# Patient Record
Sex: MALE | Race: WHITE | NOT HISPANIC OR LATINO | Employment: FULL TIME | URBAN - METROPOLITAN AREA
[De-identification: names, ages, dates, MRNs, and addresses within clinical notes are randomized per-mention and may not be internally consistent; named-entity substitution may affect disease eponyms.]

---

## 2017-01-02 DIAGNOSIS — Z94.4 LIVER REPLACED BY TRANSPLANT (H): Primary | ICD-10-CM

## 2017-01-10 ENCOUNTER — TELEPHONE (OUTPATIENT)
Dept: PHARMACY | Facility: CLINIC | Age: 57
End: 2017-01-10

## 2017-01-10 LAB
FERRITIN SERPL-MCNC: 759 NG/ML
HCT VFR BLD AUTO: 31.8 %
HEMOGLOBIN: 10.9 G/DL (ref 13.3–17.7)
IRON BINDING CAP: 272
IRON SATURATION INDEX: 40 %
IRON: 108 UG/DL

## 2017-01-10 NOTE — TELEPHONE ENCOUNTER
Anemia Management Note  SUBJECTIVE/OBJECTIVE:  Referred by Dr. Hardeep Villa on 6/30/2016.  Primary Diagnosis: Anemia in Chronic Kidney Disease (N18.3, D63.1)      Secondary Diagnosis:  Chronic Kidney Disease, Stage 3 (N18.3)    Hgb goal range:  9-10  Epo/Darbo:   Aranesp 60 mcg/0.3ml every 28 days as needed for Hgb < 10.0 - At home   RX expires 10/27/17  Iron regimen:  Ferrous Sulfate QD - max tolerated dose  Lab exp:  7/7/17 in LETTERS     Anemia Latest Ref Rng 10/19/2016 10/27/2016 11/4/2016 11/15/2016 11/29/2016 12/13/2016 1/10/2017   MARCUS Dose - 60 mcg - - - - - -   Hemoglobin 13.3 - 17.7 g/dL - 10.6(L) - 10.2(A) 10.2(A) 10.4(A) 10.9(A)   IV Iron Dose - - - 750mg - - - -   TSAT - - 17 - 36 - 35 40   Ferritin - - 411(H) - 837 - 798 759     BP Readings from Last 3 Encounters:   10/27/16 121/82   10/14/16 139/92   10/03/16 169/97     Wt Readings from Last 2 Encounters:   10/27/16 217 lb 3.2 oz (98.521 kg)   10/14/16 217 lb 6.4 oz (98.612 kg)         ASSESSMENT:  Hgb: Above goal - recommend hold dose  TSat: at goal >30% Ferritin: At goal (>100ng/mL)    PLAN:  Hold Aranesp and RTC for hgb, ferritin and iron labs then aranesp if needed in 4 week(s)    Orders needed to be renewed (for next follow-up date) in LETTERS - for external labs: None    Iron labs due:  2/7/17    Plan discussed with:  MADELIN Serna  Plan provided by:  Hong    NEXT FOLLOW-UP DATE:  2/7/17    Anemia Management Service  Rosie Blankenship,LilliamD and Moira Bo CPhT  Phone: [phone]  Fax: [phone]

## 2017-01-13 ENCOUNTER — OFFICE VISIT (OUTPATIENT)
Dept: GASTROENTEROLOGY | Facility: CLINIC | Age: 57
End: 2017-01-13
Attending: INTERNAL MEDICINE
Payer: COMMERCIAL

## 2017-01-13 VITALS
SYSTOLIC BLOOD PRESSURE: 143 MMHG | BODY MASS INDEX: 36.43 KG/M2 | HEIGHT: 69 IN | OXYGEN SATURATION: 98 % | WEIGHT: 246 LBS | HEART RATE: 83 BPM | DIASTOLIC BLOOD PRESSURE: 87 MMHG | TEMPERATURE: 97.2 F

## 2017-01-13 DIAGNOSIS — Z94.4 LIVER TRANSPLANTED (H): Primary | ICD-10-CM

## 2017-01-13 PROCEDURE — 99212 OFFICE O/P EST SF 10 MIN: CPT | Mod: ZF

## 2017-01-13 RX ORDER — MAGNESIUM OXIDE 400 MG/1
TABLET ORAL
COMMUNITY
Start: 2016-12-15 | End: 2017-01-13

## 2017-01-13 RX ORDER — MYCOPHENOLIC ACID 360 MG/1
720 TABLET, DELAYED RELEASE ORAL 2 TIMES DAILY
Qty: 120 TABLET | Refills: 11 | Status: SHIPPED | OUTPATIENT
Start: 2017-01-13 | End: 2017-12-30

## 2017-01-13 RX ORDER — PREDNISONE 5 MG/1
5 TABLET ORAL DAILY
Qty: 30 TABLET | Refills: 11 | Status: SHIPPED | OUTPATIENT
Start: 2017-01-13 | End: 2017-12-30

## 2017-01-13 RX ORDER — MAGNESIUM OXIDE 400 MG/1
400 TABLET ORAL
COMMUNITY
End: 2017-01-13

## 2017-01-13 RX ORDER — ACETAMINOPHEN 325 MG/1
650 TABLET ORAL
COMMUNITY
Start: 2016-09-30

## 2017-01-13 ASSESSMENT — ENCOUNTER SYMPTOMS: NEW SYMPTOMS OF CORONARY ARTERY DISEASE: 0

## 2017-01-13 ASSESSMENT — PAIN SCALES - GENERAL: PAINLEVEL: NO PAIN (0)

## 2017-01-13 NOTE — NURSING NOTE
Here for post liver transplant follow-up.  Accompanied by wife, Daiana.  Feeling well.  Has gained a lot of weight, we cautioned him about this.  He is aware that he needs to eat less and exercise.  Creat from yesterday's labs is 2.0 and prograf level 4.6.  He is now 9 mos post transplant.  Dr. Rand in agreement that we should try to wean off CNI.  I instructed him to wean as follows:  Currently taking tacrolimus 4 bid and cellcept 500 mg po bid.  I asked him to start taking prednisone 5 mg po qd and myfortic 720 mg po bid.  He has a supply of MMF and will take 1000 mg of this until his supply runs out, then make the switch to Myfortic. Asked him to reduce prograf to 2 mg po bid for a week, then 1 mg po bid for a week, then 1 mg po qd for a week then stop.  I asked him to have labs checked every 2 weeks for 2 mos.  If all remains normal,then monthly x 2, then q 2 mos.  He will need a new lab order faxed to his lab in [place].   Reviewed recent labs and assisted with interpretation.  Is recording labs in post transplant lab book.  Reviewed need for annual follow-up here with hepatology and dermatology.    Current immunosuppression:    Begin prograf wean.  Start pred 5 and myfortic 720 bid    Complaints:  Weight gain.    Med changes: Weaning prograf as above.  Updated patient's med card.    Lab frequency:  See note.    Follow-up:  Hepatology, derm and PCP annually.

## 2017-01-13 NOTE — Clinical Note
"1/13/2017      RE: Yung Grande  [address] [facility] [facility] [address]       SUBJECTIVE:  Mr. Grande is a 56-year-old man status post liver transplant 04/05/2016 for alcoholic cirrhosis.        He has had problems with elevated creatinine, running around 1.7 to low 2s.  He is on low dose Prograf.  Was placed on everolimus by Dr. Armstrong.  His Prograf levels have been purposefully run on the low side because of the kidney function.  He has seen Dr. Villa for his elevated creatinine.  Last creat was 1.76 one month ago.      He had terrible mouth sores attributed to everolimus which we stopped.He is also on low dose MMF now.       He has gone back to work, working 5 days a week but not full days. Energy level is getting better.  He has gained a significant amount of weight and is getting a little bit worried about this. Currently at 246 and thinks he should weigh less than 200.         He has also had ongoing anemia and had been on iron for this.        PHYSICAL EXAMINATION:    VITAL SIGNS:  Vitals: /87 mmHg  Pulse 83  Temp(Src) 97.2  F (36.2  C) (Oral)  Ht 1.753 m (5' 9\")  Wt 111.585 kg (246 lb)  BMI 36.31 kg/m2  SpO2 98%BMI=   GENERAL:  Pleasant, well-appearing, in no acute distress.    HEENT:  No icterus.  He has 2 aphthous ulcers on the lower gum line.      LYMPH:  No supraclavicular or cervical lymphadenopathy.    CARDIOVASCULAR:  Regular rate and rhythm.    CHEST:  Lungs are clear.    ABDOMEN:  Bowel sounds are present.  Abdomen is soft, nontender, nondistended.  Scar is well healed.    EXTREMITIES:  No edema.    SKIN:  No rash.    NEUROLOGIC:  Speech is fluent and clear.  No asterixis or tremor.        LABORATORY DATA:  Labs from 12/13/2016 were reviewed.  His kidney function is about the same at 1.7.  Liver function is normal.  Hemoglobin is stable.        ASSESSMENT AND PLAN:  A 56-year-old man status post liver transplant 9 months ago for alcoholic cirrhosis.  He has had some problems with side " effects from immunosuppression.  I would like to switch him to MMF and prednisone.        I cautioned him about his metabolism and weight gain and risks associated with weight gain. Again encouraged him to include regular exercise and dietary modifications.    He should stop iron and iron levels should stop getting measured so often.       He is otherwise doing quite well.  I will see Kareem back in 3 months.  This was a 25 minute visit, over 50% counseling and coordination of care.       Stefany Rand MD

## 2017-01-13 NOTE — MR AVS SNAPSHOT
After Visit Summary   1/13/2017    Yung Grande    MRN: [id number]           Patient Information     Date Of Birth          1960        Visit Information        Provider Department      1/13/2017 10:10 AM Stefany Rand MD [facility] Hepatology         Follow-ups after your visit        Your next 10 appointments already scheduled     Mar 09, 2017  1:30 PM   (Arrive by 1:00 PM)   Return Visit with Hardeep Villa MD   [facility] Nephrology ([facility])    [address] [place] [id number] [phone]            Apr 14, 2017 10:50 AM   (Arrive by 10:35 AM)   Return Liver Transplant with Stefany Rand MD   [facility] Hepatology ([facility])    [address] [place] [id number] [phone]            Oct 02, 2017 10:10 AM   (Arrive by 9:55 AM)   Liver Return Post Op with Yazan Khanna MD   [facility] Solid Organ Transplant ([facility])    [address] [place] [id number] [phone]              Who to contact     If you have questions or need follow up information about today's clinic visit or your schedule please contact [facility] HEPATOLOGY directly at [phone].  Normal or non-critical lab and imaging results will be communicated to you by MyChart, letter or phone within 4 business days after the clinic has received the results. If you do not hear from us within 7 days, please contact the clinic through MyChart or phone. If you have a critical or abnormal lab result, we will notify you by phone as soon as possible.  Submit refill requests through [employer] or call your pharmacy and they will forward the refill request to us. Please allow 3 business days for your refill to be completed.          Additional Information About Your Visit        [employer] Information     [employer] gives you secure access to your  "electronic health record. If you see a primary care provider, you can also send messages to your care team and make appointments. If you have questions, please call your primary care clinic.  If you do not have a primary care provider, please call [phone] and they will assist you.        Care EveryWhere ID     This is your Care EveryWhere ID. This could be used by other organizations to access your [place] medical records  [id number]        Your Vitals Were     Pulse Temperature Height BMI (Body Mass Index) Pulse Oximetry       83 97.2  F (36.2  C) (Oral) 1.753 m (5' 9\") 36.31 kg/m2 98%        Blood Pressure from Last 3 Encounters:   01/13/17 143/87   10/27/16 121/82   10/14/16 139/92    Weight from Last 3 Encounters:   01/13/17 111.585 kg (246 lb)   10/27/16 98.521 kg (217 lb 3.2 oz)   10/14/16 98.612 kg (217 lb 6.4 oz)              Today, you had the following     No orders found for display         Today's Medication Changes          These changes are accurate as of: 1/13/17 10:42 AM.  If you have any questions, ask your nurse or doctor.               These medicines have changed or have updated prescriptions.        Dose/Directions    tacrolimus 1 MG capsule   Commonly known as:  PROGRAF - GENERIC EQUIVALENT   Indication:  liver   This may have changed:    - how much to take  - how to take this  - when to take this  - additional instructions   Used for:  Liver replaced by transplant (H)        Take 4 mg every AM, and 4 mg every PM, then when everolimus arrives decrease tacrolimus dose to 3 mg every 12 hours.   Quantity:  180 capsule   Refills:  11                Primary Care Provider Office Phone # Fax #    Anthony OLIVERA Weinstein [phone]       [facility] [address]  [facility] [address]        Thank you!     Thank you for choosing [facility] HEPATOLOGY  for your care. Our goal is always to provide you with excellent care. Hearing back from our patients is one way we " can continue to improve our services. Please take a few minutes to complete the written survey that you may receive in the mail after your visit with us. Thank you!             Your Updated Medication List - Protect others around you: Learn how to safely use, store and throw away your medicines at www.disposemymeds.org.          This list is accurate as of: 1/13/17 10:42 AM.  Always use your most recent med list.                   Brand Name Dispense Instructions for use    Aluminum & Magnesium Hydroxide 200-200 MG/5ML Susp      5 mLs       CENTRUM SILVER per tablet      Take 1 tablet by mouth daily       darbepoetin dianelys 60 MCG/0.3ML injection    ARANESP (ALBUMIN FREE)    0.3 mL    Inject 0.3 mLs (60 mcg) Subcutaneous every 28 days As needed for hgb <10g/dL       ferrous sulfate 325 (65 FE) MG tablet    IRON     Take 325 mg by mouth daily (with breakfast)       MAPAP 325 MG tablet   Generic drug:  acetaminophen      Take 650 mg by mouth       mycophenolate 500 MG tablet    CELLCEPT - GENERIC EQUIVALENT    60 tablet    Take 1 tablet (500 mg) by mouth every 12 hours       tacrolimus 1 MG capsule    PROGRAF - GENERIC EQUIVALENT    180 capsule    Take 4 mg every AM, and 4 mg every PM, then when everolimus arrives decrease tacrolimus dose to 3 mg every 12 hours.

## 2017-01-13 NOTE — Clinical Note
"1/13/2017       RE: Yung Grande  [address] [facility] [address] [facility] [place] [address]     Dear Colleague,    Thank you for referring your patient, Yung Grande, to the [facility] HEPATOLOGY at [facility]. Please see a copy of my visit note below.    SUBJECTIVE:  Mr. Grande is a 56-year-old man status post liver transplant 04/05/2016 for alcoholic cirrhosis.        He has had problems with elevated creatinine, running around 1.7 to low 2s.  He is on low dose Prograf.  Was placed on everolimus by Dr. Armstrong.  His Prograf levels have been purposefully run on the low side because of the kidney function.  He has seen Dr. Villa for his elevated creatinine.  Last creat was 1.76 one month ago.      He had terrible mouth sores attributed to everolimus which we stopped.He is also on low dose MMF now.       He has gone back to work, working 5 days a week but not full days. Energy level is getting better.  He has gained a significant amount of weight and is getting a little bit worried about this. Currently at 246 and thinks he should weigh less than 200.         He has also had ongoing anemia and had been on iron for this.        PHYSICAL EXAMINATION:    VITAL SIGNS:  Vitals: /87 mmHg  Pulse 83  Temp(Src) 97.2  F (36.2  C) (Oral)  Ht 1.753 m (5' 9\")  Wt 111.585 kg (246 lb)  BMI 36.31 kg/m2  SpO2 98%BMI=   GENERAL:  Pleasant, well-appearing, in no acute distress.    HEENT:  No icterus.  He has 2 aphthous ulcers on the lower gum line.      LYMPH:  No supraclavicular or cervical lymphadenopathy.    CARDIOVASCULAR:  Regular rate and rhythm.    CHEST:  Lungs are clear.    ABDOMEN:  Bowel sounds are present.  Abdomen is soft, nontender, nondistended.  Scar is well healed.    EXTREMITIES:  No edema.    SKIN:  No rash.    NEUROLOGIC:  Speech is fluent and clear.  No asterixis or tremor.        LABORATORY DATA:  Labs from 12/13/2016 were reviewed.  His kidney function is about the same at 1.7.  " Liver function is normal.  Hemoglobin is stable.        ASSESSMENT AND PLAN:  A 56-year-old man status post liver transplant 9 months ago for alcoholic cirrhosis.  He has had some problems with side effects from immunosuppression.  I would like to switch him to MMF and prednisone.        I cautioned him about his metabolism and weight gain and risks associated with weight gain. Again encouraged him to include regular exercise and dietary modifications.    He should stop iron and iron levels should stop getting measured so often.       He is otherwise doing quite well.  I will see Kareem back in 3 months.  This was a 25 minute visit, over 50% counseling and coordination of care.     Again, thank you for allowing me to participate in the care of your patient.      Sincerely,    Stefany Rand MD

## 2017-01-13 NOTE — NURSING NOTE
Chief Complaint   Patient presents with     RECHECK     Post Liver TXP   Pt roomed, vitals, meds, and allergies reviewed with pt. Pt ready for provider.  Deonte Tate, CMA

## 2017-01-13 NOTE — PROGRESS NOTES
"SUBJECTIVE:  Mr. Grande is a 56-year-old man status post liver transplant 04/05/2016 for alcoholic cirrhosis.        He has had problems with elevated creatinine, running around 1.7 to low 2s.  He is on low dose Prograf.  Was placed on everolimus by Dr. Armstrong.  His Prograf levels have been purposefully run on the low side because of the kidney function.  He has seen Dr. Villa for his elevated creatinine.  Last creat was 1.76 one month ago.      He had terrible mouth sores attributed to everolimus which we stopped.He is also on low dose MMF now.       He has gone back to work, working 5 days a week but not full days. Energy level is getting better.  He has gained a significant amount of weight and is getting a little bit worried about this. Currently at 246 and thinks he should weigh less than 200.         He has also had ongoing anemia and had been on iron for this.        PHYSICAL EXAMINATION:    VITAL SIGNS:  Vitals: /87 mmHg  Pulse 83  Temp(Src) 97.2  F (36.2  C) (Oral)  Ht 1.753 m (5' 9\")  Wt 111.585 kg (246 lb)  BMI 36.31 kg/m2  SpO2 98%BMI=   GENERAL:  Pleasant, well-appearing, in no acute distress.    HEENT:  No icterus.  He has 2 aphthous ulcers on the lower gum line.      LYMPH:  No supraclavicular or cervical lymphadenopathy.    CARDIOVASCULAR:  Regular rate and rhythm.    CHEST:  Lungs are clear.    ABDOMEN:  Bowel sounds are present.  Abdomen is soft, nontender, nondistended.  Scar is well healed.    EXTREMITIES:  No edema.    SKIN:  No rash.    NEUROLOGIC:  Speech is fluent and clear.  No asterixis or tremor.        LABORATORY DATA:  Labs from 12/13/2016 were reviewed.  His kidney function is about the same at 1.7.  Liver function is normal.  Hemoglobin is stable.        ASSESSMENT AND PLAN:  A 56-year-old man status post liver transplant 9 months ago for alcoholic cirrhosis.  He has had some problems with side effects from immunosuppression.  I would like to switch him to MMF and " prednisone.        I cautioned him about his metabolism and weight gain and risks associated with weight gain. Again encouraged him to include regular exercise and dietary modifications.    He should stop iron and iron levels should stop getting measured so often.       He is otherwise doing quite well.  I will see Kareem back in 3 months.  This was a 25 minute visit, over 50% counseling and coordination of care.

## 2017-01-19 ENCOUNTER — DOCUMENTATION ONLY (OUTPATIENT)
Dept: TRANSPLANT | Facility: CLINIC | Age: 57
End: 2017-01-19

## 2017-01-19 NOTE — PROGRESS NOTES
Per protocol, this patient should be having ETG q 3 mos.  Hasn't had one since 2/2016.  Please fax an order to his clinic.

## 2017-01-27 LAB — HEMOGLOBIN: 11.4 G/DL (ref 13.3–17.7)

## 2017-02-03 ENCOUNTER — TELEPHONE (OUTPATIENT)
Dept: PHARMACY | Facility: CLINIC | Age: 57
End: 2017-02-03

## 2017-02-03 DIAGNOSIS — N18.9 ANEMIA DUE TO CHRONIC KIDNEY DISEASE: Primary | ICD-10-CM

## 2017-02-03 DIAGNOSIS — D63.1 ANEMIA DUE TO CHRONIC KIDNEY DISEASE: Primary | ICD-10-CM

## 2017-02-03 RX ORDER — FERROUS SULFATE 325(65) MG
325 TABLET ORAL
Qty: 100 TABLET | COMMUNITY
Start: 2017-02-03 | End: 2017-04-18

## 2017-02-03 NOTE — TELEPHONE ENCOUNTER
Anemia Management Note  SUBJECTIVE/OBJECTIVE:  Referred by Dr. Hardeep Villa on 6/30/2016.  Primary Diagnosis: Anemia in Chronic Kidney Disease (N18.3, D63.1)      Secondary Diagnosis:  Chronic Kidney Disease, Stage 3 (N18.3)    Hgb goal range:  9-10  Epo/Darbo:   Aranesp 60 mcg/0.3ml every 28 days as needed for Hgb < 10.0 - At home   RX expires 10/27/17  Iron regimen:  Ferrous Sulfate QOD - max tolerated dose  Lab exp:  7/7/17 in LETTERS     Anemia Latest Ref Rng 10/27/2016 11/4/2016 11/15/2016 11/29/2016 12/13/2016 1/10/2017 1/27/2017   MARCUS Dose - - - - - - - -   Hemoglobin 13.3 - 17.7 g/dL 10.6(L) - 10.2(A) 10.2(A) 10.4(A) 10.9(A) 11.4(A)   IV Iron Dose - - 750mg - - - - -   TSAT - 17 - 36 - 35 40 -   Ferritin - 411(H) - 247 - 798 759 -     BP Readings from Last 3 Encounters:   01/13/17 143/87   10/27/16 121/82   10/14/16 139/92     Wt Readings from Last 2 Encounters:   01/13/17 246 lb (111.585 kg)   10/27/16 217 lb 3.2 oz (98.521 kg)     ASSESSMENT:  Hgb:at goal - continue to monitor - DC aranesp as he has not needed in 3 months  TSat: at goal >30% Ferritin: At goal (>100ng/mL). Continue ferrous sulfate 325mg every other day    PLAN:  DC'd aranesp  Check hgb in one month  Check iron studies and hgb in 3 month(s)  DC anemia service if hgb stable    Orders needed to be renewed (for next follow-up date) in [facility]: None    Iron labs due:  4/10    Plan discussed with:  MADELIN Serna  Plan provided by:  Rosie    NEXT FOLLOW-UP DATE:  3/10    Anemia Management Service  Rosie Blankenship,LilliamD and Moira Bo CPhT  Phone: [phone]  Fax: [phone]

## 2017-02-20 DIAGNOSIS — R79.89 ELEVATED SERUM CREATININE: Primary | ICD-10-CM

## 2017-02-20 NOTE — NURSING NOTE
Labs per clinic 2A protocol.  Follow up/eleavted cr  Last OV: 10/27/16  Luz Elena Porras LPN  Nephrology  [facility]  [phone]

## 2017-03-07 LAB — HEMOGLOBIN: 10.8 G/DL (ref 13.3–17.7)

## 2017-03-08 ASSESSMENT — ENCOUNTER SYMPTOMS
WEIGHT GAIN: 1
NAIL CHANGES: 0
WEIGHT LOSS: 0
POOR WOUND HEALING: 0
EYE PAIN: 0
POLYPHAGIA: 1
EYE WATERING: 0
EYE REDNESS: 0
FEVER: 0
DECREASED APPETITE: 0
POLYDIPSIA: 0
ALTERED TEMPERATURE REGULATION: 0
FATIGUE: 0
CHILLS: 0
NIGHT SWEATS: 0
HALLUCINATIONS: 0
DOUBLE VISION: 0
SKIN CHANGES: 0
EYE IRRITATION: 0
INCREASED ENERGY: 0

## 2017-03-09 ENCOUNTER — OFFICE VISIT (OUTPATIENT)
Dept: NEPHROLOGY | Facility: CLINIC | Age: 57
End: 2017-03-09
Attending: INTERNAL MEDICINE
Payer: COMMERCIAL

## 2017-03-09 VITALS
TEMPERATURE: 98.3 F | SYSTOLIC BLOOD PRESSURE: 117 MMHG | HEIGHT: 69 IN | OXYGEN SATURATION: 98 % | HEART RATE: 89 BPM | BODY MASS INDEX: 38.36 KG/M2 | WEIGHT: 259 LBS | DIASTOLIC BLOOD PRESSURE: 81 MMHG

## 2017-03-09 DIAGNOSIS — N18.30 CKD (CHRONIC KIDNEY DISEASE) STAGE 3, GFR 30-59 ML/MIN (H): Primary | ICD-10-CM

## 2017-03-09 DIAGNOSIS — R79.89 ELEVATED SERUM CREATININE: ICD-10-CM

## 2017-03-09 LAB
CREAT UR-MCNC: 12 MG/DL
PROT UR-MCNC: 0.08 G/L
PROT/CREAT 24H UR: 0.68 G/G CR (ref 0–0.2)

## 2017-03-09 PROCEDURE — 99212 OFFICE O/P EST SF 10 MIN: CPT | Mod: ZF

## 2017-03-09 PROCEDURE — 84156 ASSAY OF PROTEIN URINE: CPT | Performed by: INTERNAL MEDICINE

## 2017-03-09 ASSESSMENT — PAIN SCALES - GENERAL: PAINLEVEL: NO PAIN (0)

## 2017-03-09 NOTE — NURSING NOTE
"Chief Complaint   Patient presents with     RECHECK     Follow up ANA       Initial /81  Pulse 89  Temp 98.3  F (36.8  C) (Oral)  Ht 1.753 m (5' 9\")  Wt 117.5 kg (259 lb)  SpO2 98%  BMI 38.25 kg/m2 Estimated body mass index is 38.25 kg/(m^2) as calculated from the following:    Height as of this encounter: 1.753 m (5' 9\").    Weight as of this encounter: 117.5 kg (259 lb).  Medication Reconciliation: complete   Evangelina Hardy. CMA    "

## 2017-03-09 NOTE — MR AVS SNAPSHOT
After Visit Summary   3/9/2017    Yung Grande    MRN: [id number]           Patient Information     Date Of Birth          1960        Visit Information        Provider Department      3/9/2017 1:30 PM Hardeep Villa MD [facility] Nephrology         Follow-ups after your visit        Follow-up notes from your care team     Return in about 6 months (around 9/9/2017).      Your next 10 appointments already scheduled     Apr 18, 2017 11:50 AM CDT   (Arrive by 11:35 AM)   Return Liver Transplant with Stefany Rand MD   [facility] Hepatology ([facility])    [address] [place] [id number]   [phone]            Oct 02, 2017 10:10 AM CDT   (Arrive by 9:55 AM)   Liver Return Post Op with Yazan Khanna MD   [facility] Solid Organ Transplant ([facility])    [address] [place] [id number] [phone]              Future tests that were ordered for you today     Open Future Orders        Priority Expected Expires Ordered    Renal panel Routine  3/9/2018 3/9/2017    CBC with platelets Routine  3/9/2018 3/9/2017            Who to contact     If you have questions or need follow up information about today's clinic visit or your schedule please contact [facility] NEPHROLOGY directly at [phone].  Normal or non-critical lab and imaging results will be communicated to you by MyChart, letter or phone within 4 business days after the clinic has received the results. If you do not hear from us within 7 days, please contact the clinic through MyChart or phone. If you have a critical or abnormal lab result, we will notify you by phone as soon as possible.  Submit refill requests through [employer] or call your pharmacy and they will forward the refill request to us. Please allow 3 business days for your refill to be completed.          Additional Information About Your Visit       "  [employer] Information     [employer] gives you secure access to your electronic health record. If you see a primary care provider, you can also send messages to your care team and make appointments. If you have questions, please call your primary care clinic.  If you do not have a primary care provider, please call [phone] and they will assist you.        Care EveryWhere ID     This is your Care EveryWhere ID. This could be used by other organizations to access your [place] medical records  [id number]        Your Vitals Were     Pulse Temperature Height Pulse Oximetry BMI (Body Mass Index)       89 98.3  F (36.8  C) (Oral) 1.753 m (5' 9\") 98% 38.25 kg/m2        Blood Pressure from Last 3 Encounters:   03/09/17 117/81   01/13/17 143/87   10/27/16 121/82    Weight from Last 3 Encounters:   03/09/17 117.5 kg (259 lb)   01/13/17 111.6 kg (246 lb)   10/27/16 98.5 kg (217 lb 3.2 oz)              Today, you had the following     No orders found for display       Primary Care Provider Office Phone # Fax #    Anthony Weinstein [phone]       [facility] [address]  [facility] [address]        Thank you!     Thank you for choosing [facility] NEPHROLOGY  for your care. Our goal is always to provide you with excellent care. Hearing back from our patients is one way we can continue to improve our services. Please take a few minutes to complete the written survey that you may receive in the mail after your visit with us. Thank you!             Your Updated Medication List - Protect others around you: Learn how to safely use, store and throw away your medicines at www.disposemymeds.org.          This list is accurate as of: 3/9/17  2:24 PM.  Always use your most recent med list.                   Brand Name Dispense Instructions for use    Aluminum & Magnesium Hydroxide 200-200 MG/5ML Susp      5 mLs Reported on 3/9/2017       CENTRUM SILVER per tablet      Take 1 tablet by mouth daily "       ferrous sulfate 325 (65 FE) MG tablet    IRON    100 tablet    Take 1 tablet (325 mg) by mouth every 48 hours       MAPAP 325 MG tablet   Generic drug:  acetaminophen      Take 650 mg by mouth       mycophenolic acid EC tablet     120 tablet    Take 2 tablets (720 mg) by mouth 2 times daily       predniSONE 5 MG tablet    DELTASONE    30 tablet    Take 1 tablet (5 mg) by mouth daily       tacrolimus 1 MG capsule    PROGRAF - GENERIC EQUIVALENT    180 capsule    Take 4 mg every AM, and 4 mg every PM, then when everolimus arrives decrease tacrolimus dose to 3 mg every 12 hours.       VITAMIN C PO      Take 500 mg by mouth daily

## 2017-03-09 NOTE — LETTER
3/9/2017      RE: Yung Grande  [address] [facility] [place] [address]       Nephrology Clinic Progress Note  Assessment & Recommendations:   56 year old male status post liver transplant on 4/5/16 with ANA requiring dialysis, now off dialysis with initial recovery. Etiology of injury is felt ot be related to HRS. Has ongoing issues with urinary retention and hyperkalemia (6.3) in the setting of high level of CNI (17.1) - now resolved. He has also had some problems with urinary retention requiring placement of a Mckinnon catheter and was on tamsulosin however now significantly improved. Doing well overall.     1. CKD - Patient had underlying dialysis-requiring ANA after liver Tx with recovery. Cr of 1.5 and improved. In total, likely hemodynamic in etiology in the setting of liver transplant with partial recovery; etiology felt to be related to HRS with CNI augmentation as well. UOP good. Electrolytes with no concerning values. No dizziness. No proteinuria.    -No need for RRT; will defer to Hepatology regarding target dose of CNI and ongoing use of mTor vs MMF   - Electrolytes: No issues.   2. Anemia - Hemoglobin of 10.8; iron studies returned with iron sat of 69% (7/21/2016). Iron replete   - No need for MARCUS at this time.   - Continue iron therapy  3. Bone and Mineral Disorder - Phos returned at 3.5 and acceptable.  4. OTLtx - 4/5; per Hepatology. Goal CNI dosing level of ~8.   - Prophylaxis - off TMP/SMX - cytopenias - defer to hepatology   - Would defer to transplant Hepatology regarding immunosuppression    Recommendations:  1) No medication changes - continue lasix  2) Will need to monitor prot/Cr with serial labs prior to changing to alternative agent (mtor, etc).   3) RTC in 6mo.    Hardeep Vilal MD     Interval History :   Doing well today with no complaints toady. He has no decreased energy and activity is increased. No CP or SOB. No AP. No problems with urination. Appetite is great and weight is  "increased as well.     Review of Systems:   A 4 point review of systems was negative except as noted above.    Physical Exam:   /81  Pulse 89  Temp 98.3  F (36.8  C) (Oral)  Ht 1.753 m (5' 9\")  Wt 117.5 kg (259 lb)  SpO2 98%  BMI 38.25 kg/m2  GENERAL APPEARANCE: alert and no distress  PULM: lungs clear to auscultation, equal air movement, no cyanosis or clubbing  CV: regular rhythm, normal rate, no rub        -edema trace.   GI: soft, distnded  : Mckinnon catheter in place - no urine in bag.   MS: trace edema.   NEURO: mentation intact and speech normal    Labs:   All labs reviewed by me  Cr of 1.52, K and HCO3 stable.   Albumin 3.4.  Prot/Cr of 0.68 (slightly increased).  Hgb of 10.8 and platelet of 116    Current Medications:  Current Outpatient Prescriptions   Medication     Ascorbic Acid (VITAMIN C PO)     ferrous sulfate (IRON) 325 (65 FE) MG tablet     acetaminophen (MAPAP) 325 MG tablet     MYFORTIC 360 MG PO EC TABLET     predniSONE (DELTASONE) 5 MG tablet     Multiple Vitamins-Minerals (CENTRUM SILVER) per tablet     Aluminum & Magnesium Hydroxide 200-200 MG/5ML SUSP     tacrolimus (PROGRAF - GENERIC EQUIVALENT) 1 MG capsule     No current facility-administered medications for this visit.         Hardeep Villa MD       "

## 2017-03-10 ENCOUNTER — TELEPHONE (OUTPATIENT)
Dept: PHARMACY | Facility: CLINIC | Age: 57
End: 2017-03-10

## 2017-03-15 NOTE — TELEPHONE ENCOUNTER
Anemia Management Note  SUBJECTIVE/OBJECTIVE:  Referred by Dr. Hardeep Villa on 6/30/2016.  Primary Diagnosis: Anemia in Chronic Kidney Disease (N18.3, D63.1)      Secondary Diagnosis:  Chronic Kidney Disease, Stage 3 (N18.3)    Hgb goal range:  9-10  Epo/Darbo:   Aranesp 60 mcg/0.3ml every 28 days as needed for Hgb < 10.0 - At home   RX expires 10/27/17  Iron regimen:  Ferrous Sulfate QOD - max tolerated dose  Lab exp:  7/7/17 in LETTERS     Anemia Latest Ref Rng & Units 11/4/2016 11/15/2016 11/29/2016 12/13/2016 1/10/2017 1/27/2017 3/7/2017   MARCUS Dose - - - - - - - -   Hemoglobin 13.3 - 17.7 g/dL - 10.2(A) 10.2(A) 10.4(A) 10.9(A) 11.4(A) 10.8(A)   IV Iron Dose - 750mg - - - - - -   TSAT % - 36 - 35 40 - -   Ferritin ng/mL - 837 - 798 759 - -      BP Readings from Last 3 Encounters:   03/09/17 117/81   01/13/17 143/87   10/27/16 121/82     Wt Readings from Last 2 Encounters:   03/09/17 259 lb (117.5 kg)   01/13/17 246 lb (111.6 kg)     ASSESSMENT:  Hgb:at goal and stable with no MARCUS.  DC anemia service  TSat: at goal >30% Ferritin: At goal (>100ng/mL)    PLAN:  Hgb stable with no MARCUS.  DC anemia service.    Plan discussed with:  MADELIN Serna  Plan provided by:  Rosie    Anemia Management Service  Rosie Blankenship,PharmD and Moira Bo CPhT  Phone: [phone]  Fax: [phone]

## 2017-03-15 NOTE — PROGRESS NOTES
Nephrology Clinic Progress Note  Assessment & Recommendations:   56 year old male status post liver transplant on 4/5/16 with ANA requiring dialysis, now off dialysis with initial recovery. Etiology of injury is felt ot be related to HRS. Has ongoing issues with urinary retention and hyperkalemia (6.3) in the setting of high level of CNI (17.1) - now resolved. He has also had some problems with urinary retention requiring placement of a Mckinnon catheter and was on tamsulosin however now significantly improved. Doing well overall.     1. CKD - Patient had underlying dialysis-requiring ANA after liver Tx with recovery. Cr of 1.5 and improved. In total, likely hemodynamic in etiology in the setting of liver transplant with partial recovery; etiology felt to be related to HRS with CNI augmentation as well. UOP good. Electrolytes with no concerning values. No dizziness. No proteinuria.    -No need for RRT; will defer to Hepatology regarding target dose of CNI and ongoing use of mTor vs MMF   - Electrolytes: No issues.   2. Anemia - Hemoglobin of 10.8; iron studies returned with iron sat of 69% (7/21/2016). Iron replete   - No need for MARCUS at this time.   - Continue iron therapy  3. Bone and Mineral Disorder - Phos returned at 3.5 and acceptable.  4. OTLtx - 4/5; per Hepatology. Goal CNI dosing level of ~8.   - Prophylaxis - off TMP/SMX - cytopenias - defer to hepatology   - Would defer to transplant Hepatology regarding immunosuppression    Recommendations:  1) No medication changes - continue lasix  2) Will need to monitor prot/Cr with serial labs prior to changing to alternative agent (mtor, etc).   3) RTC in 6mo.    Hardeep Villa MD     Interval History :   Doing well today with no complaints toady. He has no decreased energy and activity is increased. No CP or SOB. No AP. No problems with urination. Appetite is great and weight is increased as well.     Review of Systems:   A 4 point review of systems was  "negative except as noted above.    Physical Exam:   /81  Pulse 89  Temp 98.3  F (36.8  C) (Oral)  Ht 1.753 m (5' 9\")  Wt 117.5 kg (259 lb)  SpO2 98%  BMI 38.25 kg/m2  GENERAL APPEARANCE: alert and no distress  PULM: lungs clear to auscultation, equal air movement, no cyanosis or clubbing  CV: regular rhythm, normal rate, no rub        -edema trace.   GI: soft, distnded  : Mckinnon catheter in place - no urine in bag.   MS: trace edema.   NEURO: mentation intact and speech normal    Labs:   All labs reviewed by me  Cr of 1.52, K and HCO3 stable.   Albumin 3.4.  Prot/Cr of 0.68 (slightly increased).  Hgb of 10.8 and platelet of 116    Current Medications:  Current Outpatient Prescriptions   Medication     Ascorbic Acid (VITAMIN C PO)     ferrous sulfate (IRON) 325 (65 FE) MG tablet     acetaminophen (MAPAP) 325 MG tablet     MYFORTIC 360 MG PO EC TABLET     predniSONE (DELTASONE) 5 MG tablet     Multiple Vitamins-Minerals (CENTRUM SILVER) per tablet     Aluminum & Magnesium Hydroxide 200-200 MG/5ML SUSP     tacrolimus (PROGRAF - GENERIC EQUIVALENT) 1 MG capsule     No current facility-administered medications for this visit.         Hardeep Villa MD     "

## 2017-04-06 ENCOUNTER — TELEPHONE (OUTPATIENT)
Dept: PHARMACY | Facility: CLINIC | Age: 57
End: 2017-04-06

## 2017-04-18 ENCOUNTER — OFFICE VISIT (OUTPATIENT)
Dept: GASTROENTEROLOGY | Facility: CLINIC | Age: 57
End: 2017-04-18
Attending: INTERNAL MEDICINE
Payer: COMMERCIAL

## 2017-04-18 VITALS
OXYGEN SATURATION: 98 % | WEIGHT: 267.8 LBS | RESPIRATION RATE: 18 BRPM | TEMPERATURE: 98.3 F | BODY MASS INDEX: 39.66 KG/M2 | DIASTOLIC BLOOD PRESSURE: 93 MMHG | HEIGHT: 69 IN | SYSTOLIC BLOOD PRESSURE: 137 MMHG | HEART RATE: 74 BPM

## 2017-04-18 DIAGNOSIS — Z94.4 LIVER TRANSPLANTED (H): Primary | ICD-10-CM

## 2017-04-18 DIAGNOSIS — M54.40 LOW BACK PAIN WITH SCIATICA, SCIATICA LATERALITY UNSPECIFIED, UNSPECIFIED BACK PAIN LATERALITY, UNSPECIFIED CHRONICITY: ICD-10-CM

## 2017-04-18 PROCEDURE — 99213 OFFICE O/P EST LOW 20 MIN: CPT | Mod: ZF

## 2017-04-18 RX ORDER — SILDENAFIL CITRATE 20 MG/1
20 TABLET ORAL PRN
COMMUNITY
End: 2022-07-06

## 2017-04-18 ASSESSMENT — PAIN SCALES - GENERAL: PAINLEVEL: EXTREME PAIN (9)

## 2017-04-18 NOTE — MR AVS SNAPSHOT
After Visit Summary   4/18/2017    Yung Grande    MRN: [id number]           Patient Information     Date Of Birth          1960        Visit Information        Provider Department      4/18/2017 11:50 AM Stefany Rand MD [facility] Hepatology         Follow-ups after your visit        Your next 10 appointments already scheduled     Jul 18, 2017 10:10 AM CDT   (Arrive by 9:55 AM)   Return Liver Transplant with Stefany Rand MD   [facility] Hepatology ([facility])    [address] [place] [id number] [phone]            Sep 13, 2017 11:30 AM CDT   Lab with  LAB   [facility] Lab ([facility])    [address] [place] [id number] [phone]            Sep 13, 2017 12:30 PM CDT   (Arrive by 12:00 PM)   Return Visit with Gena Michel MD   [facility] Nephrology ([facility])    [address] [place] [id number] [phone]            Oct 02, 2017 10:10 AM CDT   (Arrive by 9:55 AM)   Liver Return Post Op with Yazan Khanna MD   [facility] Solid Organ Transplant ([facility])    [address] [place] [id number] [phone]              Who to contact     If you have questions or need follow up information about today's clinic visit or your schedule please contact [facility] HEPATOLOG[facility] directly at [phone].  Normal or non-critical lab and imaging results will be communicated to you by MyChart, letter or phone within 4 business days after the clinic has received the results. If you do not hear from us within 7 days, please contact the clinic through MyChart or phone. If you have a critical or abnormal lab result, we will notify you by phone as soon as possible.  Submit refill requests through [employer] or call your pharmacy and they will forward the refill  "request to us. Please allow 3 business days for your refill to be completed.          Additional Information About Your Visit        [employer] Information     [employer] gives you secure access to your electronic health record. If you see a primary care provider, you can also send messages to your care team and make appointments. If you have questions, please call your primary care clinic.  If you do not have a primary care provider, please call [phone] and they will assist you.        Care EveryWhere ID     This is your Care EveryWhere ID. This could be used by other organizations to access your [place] medical records  [id number]        Your Vitals Were     Pulse Temperature Respirations Height Pulse Oximetry BMI (Body Mass Index)    74 98.3  F (36.8  C) (Oral) 18 1.753 m (5' 9.02\") 98% 39.53 kg/m2       Blood Pressure from Last 3 Encounters:   04/18/17 (!) 137/93   03/09/17 117/81   01/13/17 143/87    Weight from Last 3 Encounters:   04/18/17 121.5 kg (267 lb 12.8 oz)   03/09/17 117.5 kg (259 lb)   01/13/17 111.6 kg (246 lb)              Today, you had the following     No orders found for display         Today's Medication Changes          These changes are accurate as of: 4/18/17 12:34 PM.  If you have any questions, ask your nurse or doctor.               Stop taking these medicines if you haven't already. Please contact your care team if you have questions.     tacrolimus 1 MG capsule   Commonly known as:  PROGRAF - GENERIC EQUIVALENT   Stopped by:  Stefany Rand MD                    Primary Care Provider Office Phone # Fax #    Anthony Weinstein [phone]       [facility] [address]  [facility] [address]        Thank you!     Thank you for choosing [facility] HEPATOLOG[facility]  for your care. Our goal is always to provide you with excellent care. Hearing back from our patients is one way we can continue to improve our services. Please take a few minutes to " complete the written survey that you may receive in the mail after your visit with us. Thank you!             Your Updated Medication List - Protect others around you: Learn how to safely use, store and throw away your medicines at www.disposemymeds.org.          This list is accurate as of: 4/18/17 12:34 PM.  Always use your most recent med list.                   Brand Name Dispense Instructions for use    CENTRUM SILVER per tablet      Take 1 tablet by mouth daily       MAPAP 325 MG tablet   Generic drug:  acetaminophen      Take 650 mg by mouth       mycophenolic acid EC tablet     120 tablet    Take 2 tablets (720 mg) by mouth 2 times daily       predniSONE 5 MG tablet    DELTASONE    30 tablet    Take 1 tablet (5 mg) by mouth daily       sildenafil 20 MG tablet    REVATIO/VIAGRA     Take 20 mg by mouth as needed (prior to sexual activity)       VITAMIN C PO      Take 500 mg by mouth daily

## 2017-04-18 NOTE — PROGRESS NOTES
"S: 56 year old male s/p DDLT 4/5/16 for ETOH.  EXPLANT: Steatosis, no HCC, PV thrombus.  IS: MMF and prednisone. Taken off tac for kidney dysfunction. Had mouth sores with everolimus.  LABS: Up to date, liver tests normal.  REJECTION: None.  BILIARY ISSUES: None  STENT: Out on 7/25/16  KIDNEY FUNCTION:   Creatinine   Date Value Ref Range Status   10/27/2016 2.01 (H) 0.66 - 1.25 mg/dL Final   Creat on 4/4/17 1.61.    BP: Good. No meds.  PREV: UTD on screening   DISEASE RECURRENCE: No alcohol for several years.  OTHER ISSUES: ongoing mild anemia, on iron. Hgb 10.9  NEW ISSUES: Significant LBP and wonders if it is due to the prednisone. Feels like it is \"deep in his bones\". Also aware that it is weight related and lifting grandson. Has been there for 6 days. Taking acetaminophen 2000 mg per day. Has not called PCP yet. Pain with walking and feels like the pain goes from his foot all the way up his back. States he does not think he needs stronger pain medication.    SOC: , here with wife. Back to work almost  full time as a . Asks if the chemicals he is exposed to are ok for him He does not always wear appropriate masks but he does wear gloves. His sister is doing poorly from liver disease (probably LASSITER) with ascites and multifocal HCC.  ROS: 10 point ROS neg other than the symptoms noted above in the HPI.    O  Vitals: Vitals: BP (!) 137/93 (BP Location: Right arm, Patient Position: Chair, Cuff Size: Adult Large)  Pulse 74  Temp 98.3  F (36.8  C) (Oral)  Resp 18  Ht 1.753 m (5' 9.02\")  Wt 121.5 kg (267 lb 12.8 oz)  SpO2 98%  BMI 39.53 kg/m2  BMI= Body mass index is 39.53 kg/(m^2).GENERAL:  Very pleasant, well-appearing, in no acute distress.    HEENT:  No icterus, no oral lesions.    LYMPH:  No supraclavicular or cervical lymphadenopathy.    CARDIOVASCULAR:  Regular rate and rhythm.    CHEST:  Lungs are clear.    ABDOMEN:  Bowel sounds are present.  Abdomen is soft, nontender, nondistended.  Scar " is well healed.      EXTREMITIES:  No edema.    SKIN:  No rash.    NEUROLOGIC:  Speech is fluent and clear.  No asterixis or tremor.      Creatinine   Date Value Ref Range Status   10/27/2016 2.01 (H) 0.66 - 1.25 mg/dL Final   ]   Lab Results   Component Value Date    BILITOTAL 0.3 08/25/2016    BILITOTAL 0.3 07/18/2016    BILITOTAL 0.4 07/14/2016    BILITOTAL 0.4 07/12/2016    BILITOTAL 0.4 07/07/2016      Lab Results   Component Value Date    ALT 18 08/25/2016      Lab Results   Component Value Date    ALBUMIN 3.7 10/27/2016       Hemoglobin   Date Value Ref Range Status   03/07/2017 10.8 (A) 13.3 - 17.7 g/dL Final   ]    Current Outpatient Prescriptions   Medication     Ascorbic Acid (VITAMIN C PO)     ferrous sulfate (IRON) 325 (65 FE) MG tablet     acetaminophen (MAPAP) 325 MG tablet     Aluminum & Magnesium Hydroxide 200-200 MG/5ML SUSP     MYFORTIC 360 MG PO EC TABLET     predniSONE (DELTASONE) 5 MG tablet     tacrolimus (PROGRAF - GENERIC EQUIVALENT) 1 MG capsule     Multiple Vitamins-Minerals (CENTRUM SILVER) per tablet     No current facility-administered medications for this visit.          A/P  56 year old male s/p LT for ETOH 1 year ago. On MMF and prednisone. Kidney function stable and follows with Dr. Villa. Given recent IS change will continue with monthly labs for now. If continued stability in 3 months, will decrease to every 2-3 months.  Medically doing extremely well.  Stop iron as his iron levels are sufficient.  Labs up to date as is cancer screening.     New back pain requires evaluation and I asked him to call his PCP today.  Told him he could call me if he was having trouble getting in because I would like to have him evaluated quickly. No changes to the prednisone, as I do not think that 5 mg is causing this.    Weight gain: he is back to his pre-transpalnt basline weight. With back pain right now, unlikely he will get any activity started at this point. Discussed this again with him, and  will continue to work on this as able.    Regarding work exposures, he should follow that same recommendations for safety and be vigilant in wearing protective gear. He has no restrictions for work from the transpalnt standpoint.   Will see back in 3 months.   This was a 25 minute visit, over 50% counseling and coordination of care.

## 2017-04-18 NOTE — NURSING NOTE
"Chief Complaint   Patient presents with     RECHECK     S/P Liver Transplant 4/5/16       Initial BP (!) 137/93 (BP Location: Right arm, Patient Position: Chair, Cuff Size: Adult Large)  Pulse 74  Temp 98.3  F (36.8  C) (Oral)  Resp 18  Ht 1.753 m (5' 9.02\")  Wt 121.5 kg (267 lb 12.8 oz)  SpO2 98%  BMI 39.53 kg/m2 Estimated body mass index is 39.53 kg/(m^2) as calculated from the following:    Height as of this encounter: 1.753 m (5' 9.02\").    Weight as of this encounter: 121.5 kg (267 lb 12.8 oz).  Medication Reconciliation: complete    "

## 2017-04-18 NOTE — LETTER
"4/18/2017      RE: Yung Grande  [address] [facility] [place] [address]       S: 56 year old male s/p DDLT 4/5/16 for ETOH.  EXPLANT: Steatosis, no HCC, PV thrombus.  IS: MMF and prednisone. Taken off tac for kidney dysfunction. Had mouth sores with everolimus.  LABS: Up to date, liver tests normal.  REJECTION: None.  BILIARY ISSUES: None  STENT: Out on 7/25/16  KIDNEY FUNCTION:   Creatinine   Date Value Ref Range Status   10/27/2016 2.01 (H) 0.66 - 1.25 mg/dL Final   Creat on 4/4/17 1.61.    BP: Good. No meds.  PREV: UTD on screening   DISEASE RECURRENCE: No alcohol for several years.  OTHER ISSUES: ongoing mild anemia, on iron. Hgb 10.9  NEW ISSUES: Significant LBP and wonders if it is due to the prednisone. Feels like it is \"deep in his bones\". Also aware that it is weight related and lifting grandson. Has been there for 6 days. Taking acetaminophen 2000 mg per day. Has not called PCP yet. Pain with walking and feels like the pain goes from his foot all the way up his back. States he does not think he needs stronger pain medication.    SOC: , here with wife. Back to work almost  full time as a . Asks if the chemicals he is exposed to are ok for him He does not always wear appropriate masks but he does wear gloves. His sister is doing poorly from liver disease (probably LASSITER) with ascites and multifocal HCC.  ROS: 10 point ROS neg other than the symptoms noted above in the HPI.    O  Vitals: Vitals: BP (!) 137/93 (BP Location: Right arm, Patient Position: Chair, Cuff Size: Adult Large)  Pulse 74  Temp 98.3  F (36.8  C) (Oral)  Resp 18  Ht 1.753 m (5' 9.02\")  Wt 121.5 kg (267 lb 12.8 oz)  SpO2 98%  BMI 39.53 kg/m2  BMI= Body mass index is 39.53 kg/(m^2).GENERAL:  Very pleasant, well-appearing, in no acute distress.    HEENT:  No icterus, no oral lesions.    LYMPH:  No supraclavicular or cervical lymphadenopathy.    CARDIOVASCULAR:  Regular rate and rhythm.    CHEST:  Lungs are clear. "    ABDOMEN:  Bowel sounds are present.  Abdomen is soft, nontender, nondistended.  Scar is well healed.      EXTREMITIES:  No edema.    SKIN:  No rash.    NEUROLOGIC:  Speech is fluent and clear.  No asterixis or tremor.      Creatinine   Date Value Ref Range Status   10/27/2016 2.01 (H) 0.66 - 1.25 mg/dL Final   ]   Lab Results   Component Value Date    BILITOTAL 0.3 08/25/2016    BILITOTAL 0.3 07/18/2016    BILITOTAL 0.4 07/14/2016    BILITOTAL 0.4 07/12/2016    BILITOTAL 0.4 07/07/2016      Lab Results   Component Value Date    ALT 18 08/25/2016      Lab Results   Component Value Date    ALBUMIN 3.7 10/27/2016       Hemoglobin   Date Value Ref Range Status   03/07/2017 10.8 (A) 13.3 - 17.7 g/dL Final   ]    Current Outpatient Prescriptions   Medication     Ascorbic Acid (VITAMIN C PO)     ferrous sulfate (IRON) 325 (65 FE) MG tablet     acetaminophen (MAPAP) 325 MG tablet     Aluminum & Magnesium Hydroxide 200-200 MG/5ML SUSP     MYFORTIC 360 MG PO EC TABLET     predniSONE (DELTASONE) 5 MG tablet     tacrolimus (PROGRAF - GENERIC EQUIVALENT) 1 MG capsule     Multiple Vitamins-Minerals (CENTRUM SILVER) per tablet     No current facility-administered medications for this visit.          A/P  56 year old male s/p LT for ETOH 1 year ago. On MMF and prednisone. Kidney function stable and follows with Dr. Villa. Given recent IS change will continue with monthly labs for now. If continued stability in 3 months, will decrease to every 2-3 months.  Medically doing extremely well.  Stop iron as his iron levels are sufficient.  Labs up to date as is cancer screening.     New back pain requires evaluation and I asked him to call his PCP today.  Told him he could call me if he was having trouble getting in because I would like to have him evaluated quickly. No changes to the prednisone, as I do not think that 5 mg is causing this.    Weight gain: he is back to his pre-transpalnt basline weight. With back pain right now,  unlikely he will get any activity started at this point. Discussed this again with him, and will continue to work on this as able.    Regarding work exposures, he should follow that same recommendations for safety and be vigilant in wearing protective gear. He has no restrictions for work from the transpalnt standpoint.   Will see back in 3 months.   This was a 25 minute visit, over 50% counseling and coordination of care.     Stefany Rand MD

## 2017-07-18 ENCOUNTER — OFFICE VISIT (OUTPATIENT)
Dept: GASTROENTEROLOGY | Facility: CLINIC | Age: 57
End: 2017-07-18
Attending: INTERNAL MEDICINE
Payer: COMMERCIAL

## 2017-07-18 VITALS
SYSTOLIC BLOOD PRESSURE: 136 MMHG | DIASTOLIC BLOOD PRESSURE: 92 MMHG | HEIGHT: 69 IN | HEART RATE: 72 BPM | TEMPERATURE: 98.3 F | BODY MASS INDEX: 38.48 KG/M2 | OXYGEN SATURATION: 98 % | WEIGHT: 259.8 LBS

## 2017-07-18 DIAGNOSIS — Z94.4 STATUS POST LIVER TRANSPLANTATION (H): Primary | ICD-10-CM

## 2017-07-18 PROCEDURE — 99212 OFFICE O/P EST SF 10 MIN: CPT | Mod: ZF

## 2017-07-18 RX ORDER — EPINEPHRINE 0.3 MG/.3ML
0.3 INJECTION SUBCUTANEOUS
COMMUNITY
Start: 2014-05-06

## 2017-07-18 RX ORDER — LORAZEPAM 0.5 MG/1
0.5 TABLET ORAL EVERY 8 HOURS PRN
Qty: 2 TABLET | Refills: 0 | Status: SHIPPED | OUTPATIENT
Start: 2017-07-18 | End: 2018-11-07

## 2017-07-18 RX ORDER — CLINDAMYCIN HCL 300 MG
CAPSULE ORAL PRN
COMMUNITY
Start: 2016-11-05 | End: 2018-01-19

## 2017-07-18 RX ORDER — MYCOPHENOLATE MOFETIL 500 MG/1
360 TABLET ORAL 2 TIMES DAILY
COMMUNITY
Start: 2016-10-27 | End: 2018-11-07

## 2017-07-18 ASSESSMENT — PAIN SCALES - GENERAL: PAINLEVEL: NO PAIN (0)

## 2017-07-18 NOTE — NURSING NOTE
"Chief Complaint   Patient presents with     RECHECK     Follow up liver transplant.       Initial BP (!) 136/92  Pulse 72  Temp 98.3  F (36.8  C) (Oral)  Ht 1.753 m (5' 9.02\")  Wt 117.8 kg (259 lb 12.8 oz)  SpO2 98%  BMI 38.34 kg/m2 Estimated body mass index is 38.34 kg/(m^2) as calculated from the following:    Height as of this encounter: 1.753 m (5' 9.02\").    Weight as of this encounter: 117.8 kg (259 lb 12.8 oz).  Medication Reconciliation: complete   Evangelina Hardy., CMA    "

## 2017-07-18 NOTE — MR AVS SNAPSHOT
After Visit Summary   7/18/2017    Yung Grande    MRN: [id number]           Patient Information     Date Of Birth          1960        Visit Information        Provider Department      7/18/2017 10:10 AM Stefany Rand MD [facility] Hepatology        Today's Diagnoses     Status post liver transplantation (H)    -  1       Follow-ups after your visit        Follow-up notes from your care team     Return in about 6 months (around 1/18/2018).      Your next 10 appointments already scheduled     Jul 26, 2017  7:00 AM CDT   (Arrive by 6:45 AM)   MR ABDOMEN W/O & W CONTRAST with [address] [facility] ([facility])    [address] [address] [phone]           Take your medicines as usual, unless your doctor tells you not to. Bring a list of your current medicines to your exam (including vitamins, minerals and over-the-counter drugs). Also bring the results of similar scans you may have had.    The day before your exam, drink extra fluids at least six 8-ounce glasses (unless your doctor tells you to restrict your fluids).   Have a blood test (creatinine test) within 30 days of your exam. Go to your clinic or Diagnostic Imaging Department for this test.   Do not eat or drink for 6 hours prior to exam.  The MRI machine uses a strong magnet. Please wear clothes without metal (snaps, zippers). A sweatsuit works well, or we may give you a hospital gown.  Please remove any body piercings and hair extensions before you arrive. You will also remove watches, jewelry, hairpins, wallets, dentures, partial dental plates and hearing aids. You may wear contact lenses, and you may be able to wear your rings. We have a safe place to keep your personal items, but it is safer to leave them at home.   **IMPORTANT** THE INSTRUCTIONS BELOW ARE ONLY FOR THOSE PATIENTS WHO HAVE BEEN TOLD THEY WILL RECEIVE SEDATION OR GENERAL  ANESTHESIA DURING THEIR MRI PROCEDURE:  IF YOU WILL RECEIVE SEDATION (take medicine to help you relax during your exam):   You must get the medicine from your doctor before you arrive. Bring the medicine to the exam. Do not take it at home.   Arrive one hour early. Bring someone who can take you home after the test. Your medicine will make you sleepy. After the exam, you may not drive, take a bus or take a taxi by yourself.   No eating 8 hours before your exam. You may have clear liquids up until 4 hours before your exam. (Clear liquids include water, clear tea, black coffee and fruit juice without pulp.)  IF YOU WILL RECEIVE ANESTHESIA (be asleep for your exam):   Arrive 1 1/2 hours early. Bring someone who can take you home after the test. You may not drive, take a bus or take a taxi by yourself.   No eating 8 hours before your exam. You may have clear liquids up until 4 hours before your exam. (Clear liquids include water, clear tea, black coffee and fruit juice without pulp.)  If you have any questions, please contact your Imaging Department exam site.            Sep 13, 2017 11:30 AM CDT   Lab with  LAB   [facility] ([facility])    [address] [place] [id number] [phone]            Sep 13, 2017 12:30 PM CDT   (Arrive by 12:00 PM)   Return Visit with Gena Michel MD   [facility] Nephrology ([facility])    [address] [place] [id number] [phone]            Oct 02, 2017 10:10 AM CDT   (Arrive by 9:55 AM)   Liver Return Post Op with Yazan Khanna MD   [facility] Solid Organ Transplant ([facility])    [address] [place] [address] [phone]            Jan 19, 2018 10:30 AM CST   (Arrive by 10:15 AM)   Return Liver Transplant with Stefany Rand MD   [facility] Hepatology ([facility])    [facility]  "[address] [id number] [phone]              Future tests that were ordered for you today     Open Future Orders        Priority Expected Expires Ordered    MRI Abdomen w & w/o contrast* Routine  8/17/2017 7/18/2017            Who to contact     If you have questions or need follow up information about today's clinic visit or your schedule please contact [facility] HEPATOLOGY directly at [phone].  Normal or non-critical lab and imaging results will be communicated to you by [employer], letter or phone within 4 business days after the clinic has received the results. If you do not hear from us within 7 days, please contact the clinic through [employer] or phone. If you have a critical or abnormal lab result, we will notify you by phone as soon as possible.  Submit refill requests through [employer] or call your pharmacy and they will forward the refill request to us. Please allow 3 business days for your refill to be completed.          Additional Information About Your Visit        [employer] Information     [employer] gives you secure access to your electronic health record. If you see a primary care provider, you can also send messages to your care team and make appointments. If you have questions, please call your primary care clinic.  If you do not have a primary care provider, please call [phone] and they will assist you.        Care EveryWhere ID     This is your Care EveryWhere ID. This could be used by other organizations to access your [facility] medical records  [id number]        Your Vitals Were     Pulse Temperature Height Pulse Oximetry BMI (Body Mass Index)       72 98.3  F (36.8  C) (Oral) 1.753 m (5' 9.02\") 98% 38.34 kg/m2        Blood Pressure from Last 3 Encounters:   07/18/17 (!) 136/92   04/18/17 (!) 137/93   03/09/17 117/81    Weight from Last 3 Encounters:   07/18/17 117.8 kg (259 lb 12.8 oz)   04/18/17 121.5 kg (267 lb 12.8 oz)   03/09/17 117.5 kg (259 lb)    "              Today's Medication Changes          These changes are accurate as of: 7/18/17 11:33 AM.  If you have any questions, ask your nurse or doctor.               Start taking these medicines.        Dose/Directions    LORazepam 0.5 MG tablet   Commonly known as:  ATIVAN   Used for:  Status post liver transplantation (H)        Dose:  0.5 mg   Take 1 tablet (0.5 mg) by mouth every 8 hours as needed for anxiety (Take one tab at home and repeat as needed.)   Quantity:  2 tablet   Refills:  0            Where to get your medicines      Some of these will need a paper prescription and others can be bought over the counter.  Ask your nurse if you have questions.     Bring a paper prescription for each of these medications     LORazepam 0.5 MG tablet                Primary Care Provider Office Phone # Fax #    Anthony Weinstein [phone]       [facility] [address] [facility] [address]        Equal Access to Services     [facility] : Hadii michelle wingo Solynda, waaxda luqadaha, qaybta kaalmada adevincentyamelodie, kahlil dominguez . So [facility] [phone].    ATENCIÓN: Si habla español, tiene a chavez disposición servicios gratuitos de asistencia lingüística. Saul al [phone].    We comply with applicable federal civil rights laws and [place] laws. We do not discriminate on the basis of race, color, national origin, age, disability sex, sexual orientation or gender identity.            Thank you!     Thank you for choosing [facility] HEPATOLOGY  for your care. Our goal is always to provide you with excellent care. Hearing back from our patients is one way we can continue to improve our services. Please take a few minutes to complete the written survey that you may receive in the mail after your visit with us. Thank you!             Your Updated Medication List - Protect others around you: Learn how to safely use, store and throw away your medicines at  www.disposemymeds.org.          This list is accurate as of: 7/18/17 11:33 AM.  Always use your most recent med list.                   Brand Name Dispense Instructions for use Diagnosis    CENTRUM SILVER per tablet      Take 1 tablet by mouth daily        clindamycin 300 MG capsule    CLEOCIN     as needed    Status post liver transplantation (H)       EPIPEN 2-CHEPE 0.3 MG/0.3ML injection 2-pack   Generic drug:  EPINEPHrine      Inject 0.3 mg into the muscle    Status post liver transplantation (H)       LORazepam 0.5 MG tablet    ATIVAN    2 tablet    Take 1 tablet (0.5 mg) by mouth every 8 hours as needed for anxiety (Take one tab at home and repeat as needed.)    Status post liver transplantation (H)       MAPAP 325 MG tablet   Generic drug:  acetaminophen      Take 650 mg by mouth        mycophenolate 500 MG tablet    CELLCEPT - GENERIC EQUIVALENT     Take 360 mg by mouth 2 times daily    Status post liver transplantation (H)       mycophenolic acid EC tablet     120 tablet    Take 2 tablets (720 mg) by mouth 2 times daily    Liver transplanted (H)       predniSONE 5 MG tablet    DELTASONE    30 tablet    Take 1 tablet (5 mg) by mouth daily    Liver transplanted (H)       sildenafil 20 MG tablet    REVATIO/VIAGRA     Take 20 mg by mouth as needed (prior to sexual activity)        VITAMIN C PO      Take 500 mg by mouth daily

## 2017-07-18 NOTE — PROGRESS NOTES
"S: 57 year old male s/p DDLT 16 for ETOH.  EXPLANT: Steatosis, no HCC, PV thrombus.  IS: MMF and prednisone. Taken off tac for kidney dysfunction. Had mouth sores with everolimus.  LABS: Up to date, liver tests normal 17  REJECTION: None.  BILIARY ISSUES: None  STENT: Out on 16  KIDNEY FUNCTION: creat 1.62 on 17    BP: Good. No meds.  PREV: UTD on screening   DISEASE RECURRENCE: No alcohol for several years.  OTHER ISSUES: ongoing mild anemia, on iron. Hgb 10.9    NEW ISSUES:Had LBP, saw PCP, got PT. HElping a lot.     SOC: , here with wife. Back to work full time as a . Going to their cabin [place] away in August.    His sister  from liver disease (probably LASSITER) and multifocal HCC in the last couple of months.    ROS: 10 point ROS neg other than the symptoms noted above in the HPI. Had a sensation in his abdomen after LT that was there for months, now gone.     O  Vitals: BP (!) 136/92  Pulse 72  Temp 98.3  F (36.8  C) (Oral)  Ht 1.753 m (5' 9.02\")  Wt 117.8 kg (259 lb 12.8 oz)  SpO2 98%  BMI 38.34 kg/m2  BMI= Body mass index is 38.34 kg/(m^2).  GENERAL:  Very pleasant, well-appearing, in no acute distress.    HEENT:  No icterus, no oral lesions.    LYMPH:  No supraclavicular or cervical lymphadenopathy.    CARDIOVASCULAR:  Regular rate and rhythm.    CHEST:  Lungs are clear.    ABDOMEN:  Bowel sounds are present.  Abdomen is soft, nontender, nondistended.  Scar is well healed.      EXTREMITIES:  No edema.    SKIN:  No rash.    NEUROLOGIC:  Speech is fluent and clear.  No asterixis or tremor.    Current Outpatient Prescriptions   Medication     clindamycin (CLEOCIN) 300 MG capsule     EPINEPHrine (EPIPEN 2-CHEPE) 0.3 MG/0.3ML injection 2-pack     mycophenolate (CELLCEPT - GENERIC EQUIVALENT) 500 MG tablet     LORazepam (ATIVAN) 0.5 MG tablet     sildenafil (REVATIO/VIAGRA) 20 MG tablet     Ascorbic Acid (VITAMIN C PO)     acetaminophen (MAPAP) 325 MG tablet     MYFORTIC 360 " MG PO EC TABLET     predniSONE (DELTASONE) 5 MG tablet     Multiple Vitamins-Minerals (CENTRUM SILVER) per tablet     No current facility-administered medications for this visit.        A/P      57 year old male s/p LT for ETOH 4/2016. On MMF and prednisone. Kidney function stable and follows with Dr. Villa. Given recent IS change was getting monthly labs. Can switch to every 2 months.    Had a spot in transplanted liver on CT. Will follow up with MRI. Gave him ativan 0.5 mg 2 tabs as he has anxiety about it.    Discussed weight and exercise again.     Medically doing extremely well.  Stopped iron as his iron levels are sufficient.  Labs up to date as is cancer screening.     RTC 6 months.    This was a 30 minute visit, over 50% counseling and coordination of care.

## 2017-07-31 ENCOUNTER — TELEPHONE (OUTPATIENT)
Dept: TRANSPLANT | Facility: CLINIC | Age: 57
End: 2017-07-31

## 2017-07-31 NOTE — TELEPHONE ENCOUNTER
Left VM for pt to return call to give results of abdominal MRI done last week.    Per Dr. Rand:  The spot looks like a hemangioma (blood vessel). These are not precancerous and he needs no follow up for this.

## 2017-08-31 DIAGNOSIS — N18.30 CKD (CHRONIC KIDNEY DISEASE) STAGE 3, GFR 30-59 ML/MIN (H): Primary | ICD-10-CM

## 2017-09-13 ENCOUNTER — OFFICE VISIT (OUTPATIENT)
Dept: NEPHROLOGY | Facility: CLINIC | Age: 57
End: 2017-09-13
Attending: INTERNAL MEDICINE
Payer: COMMERCIAL

## 2017-09-13 VITALS — SYSTOLIC BLOOD PRESSURE: 122 MMHG | DIASTOLIC BLOOD PRESSURE: 85 MMHG | HEART RATE: 70 BPM | OXYGEN SATURATION: 96 %

## 2017-09-13 DIAGNOSIS — N18.30 CKD (CHRONIC KIDNEY DISEASE) STAGE 3, GFR 30-59 ML/MIN (H): Primary | ICD-10-CM

## 2017-09-13 DIAGNOSIS — Z94.4 LIVER TRANSPLANT STATUS (H): ICD-10-CM

## 2017-09-13 DIAGNOSIS — N18.30 CKD (CHRONIC KIDNEY DISEASE) STAGE 3, GFR 30-59 ML/MIN (H): ICD-10-CM

## 2017-09-13 LAB
ALBUMIN SERPL-MCNC: 4 G/DL (ref 3.4–5)
ANION GAP SERPL CALCULATED.3IONS-SCNC: 6 MMOL/L (ref 3–14)
BUN SERPL-MCNC: 16 MG/DL (ref 7–30)
CALCIUM SERPL-MCNC: 9.1 MG/DL (ref 8.5–10.1)
CHLORIDE SERPL-SCNC: 104 MMOL/L (ref 94–109)
CO2 SERPL-SCNC: 27 MMOL/L (ref 20–32)
CREAT SERPL-MCNC: 1.51 MG/DL (ref 0.66–1.25)
CREAT UR-MCNC: 21 MG/DL
DEPRECATED CALCIDIOL+CALCIFEROL SERPL-MC: 40 UG/L (ref 20–75)
GFR SERPL CREATININE-BSD FRML MDRD: 48 ML/MIN/1.7M2
GLUCOSE SERPL-MCNC: 94 MG/DL (ref 70–99)
HGB BLD-MCNC: 12.3 G/DL (ref 13.3–17.7)
PHOSPHATE SERPL-MCNC: 3 MG/DL (ref 2.5–4.5)
POTASSIUM SERPL-SCNC: 4.6 MMOL/L (ref 3.4–5.3)
PROT UR-MCNC: 0.06 G/L
PROT/CREAT 24H UR: 0.28 G/G CR (ref 0–0.2)
PTH-INTACT SERPL-MCNC: 90 PG/ML (ref 12–72)
SODIUM SERPL-SCNC: 137 MMOL/L (ref 133–144)

## 2017-09-13 PROCEDURE — 84156 ASSAY OF PROTEIN URINE: CPT | Performed by: INTERNAL MEDICINE

## 2017-09-13 PROCEDURE — 83970 ASSAY OF PARATHORMONE: CPT | Performed by: INTERNAL MEDICINE

## 2017-09-13 PROCEDURE — 36415 COLL VENOUS BLD VENIPUNCTURE: CPT | Performed by: INTERNAL MEDICINE

## 2017-09-13 PROCEDURE — 82306 VITAMIN D 25 HYDROXY: CPT | Performed by: INTERNAL MEDICINE

## 2017-09-13 PROCEDURE — 80069 RENAL FUNCTION PANEL: CPT | Performed by: INTERNAL MEDICINE

## 2017-09-13 PROCEDURE — 85018 HEMOGLOBIN: CPT | Performed by: INTERNAL MEDICINE

## 2017-09-13 PROCEDURE — 99212 OFFICE O/P EST SF 10 MIN: CPT | Mod: ZF

## 2017-09-13 ASSESSMENT — PAIN SCALES - GENERAL: PAINLEVEL: NO PAIN (0)

## 2017-09-13 NOTE — NURSING NOTE
"Chief Complaint   Patient presents with     RECHECK     Follow up ANA.       Initial /85  Pulse 70  SpO2 96% Estimated body mass index is 38.34 kg/(m^2) as calculated from the following:    Height as of 7/18/17: 1.753 m (5' 9.02\").    Weight as of 7/18/17: 117.8 kg (259 lb 12.8 oz).  Medication Reconciliation: complete   Holli Zavaleta CMA    "

## 2017-09-13 NOTE — LETTER
9/13/2017      RE: Yung Grande  [address] [facility] [address]       Nephrology Clinic Progress Note      Interval History : Mr Grande denies any new complaints. He says he has been gaining weight. He denies any LE edema, shortness of breath, nausea, vomiting, abdominal pain, dizziness, lightheadedness. His tacrolimus has been discontinued since 4/17.     Assessment & Recommendations:   56 year old male status post liver transplant on 4/5/16 with ANA requiring dialysis, now off dialysis with initial recovery. Etiology of injury is felt ot be related to HRS. Has ongoing issues with urinary retention and hyperkalemia (6.3) in the setting of high level of CNI (17.1) - now resolved. He has also had some problems with urinary retention requiring placement of a Mckinnon catheter and was on tamsulosin however now significantly improved. Doing well overall.     1. CKD - Patient had underlying dialysis-requiring ANA after liver Tx with recovery. Cr of 1.5 and improved. This is likely his baseline with eGFR of ~ 48. In total, likely hemodynamic in etiology in the setting of liver transplant with partial recovery; etiology of ANA was felt to be related to HRS with CNI augmentation as well. UOP good. Electrolytes with no concerning values. No dizziness.   --Urine P/Cr ratio of 0.28g/g. Will continue to monitor.   2. HTN/Volume: his blood pressures is optimal without antihypertensives. Currently appears euvolemic.   3. Anemia - Hb now at 12.  4. Bone and Mineral Disorder - Phos returned at 3.5 and acceptable.  5. OTLtx - 4/5; per Hepatology. He is now off tacrolimus and on MMF.     RTC in 6 month  No changes made in care plan today.     Gena Michel MD     Review of Systems:   A 4 point review of systems was negative except as noted above.    Physical Exam:   /85  Pulse 70  SpO2 96%  Exam:  Constitutional: healthy, alert and no distress  Head: Normocephalic.   ENT: ENT exam normal, no neck nodes or sinus  tenderness  Cardiovascular: negative, PMI normal. No lifts, heaves, or thrills. RRR. No murmurs, clicks gallops or rub  Respiratory: negative, Percussion normal. Good diaphragmatic excursion. Lungs clear  Gastrointestinal: Abdomen soft, non-tender. BS normal. No masses, organomegaly  : Deferred  Musculoskeletal: extremities normal- no gross deformities noted, gait normal and normal muscle tone. Trace LE edema   Skin: no suspicious lesions or rashes  Psychiatric: mentation appears normal and affect normal/bright    Labs:   Last Basic Metabolic Panel:  Lab Results   Component Value Date     09/13/2017      Lab Results   Component Value Date    POTASSIUM 4.6 09/13/2017     Lab Results   Component Value Date    CHLORIDE 104 09/13/2017     Lab Results   Component Value Date    CLAUDETTE 9.1 09/13/2017     Lab Results   Component Value Date    CO2 27 09/13/2017     Lab Results   Component Value Date    BUN 16 09/13/2017     Lab Results   Component Value Date    CR 1.51 09/13/2017     Lab Results   Component Value Date    GLC 94 09/13/2017         Current Medications:  Current Outpatient Prescriptions   Medication     clindamycin (CLEOCIN) 300 MG capsule     EPINEPHrine (EPIPEN 2-CHEPE) 0.3 MG/0.3ML injection 2-pack     mycophenolate (CELLCEPT - GENERIC EQUIVALENT) 500 MG tablet     sildenafil (REVATIO/VIAGRA) 20 MG tablet     Ascorbic Acid (VITAMIN C PO)     acetaminophen (MAPAP) 325 MG tablet     predniSONE (DELTASONE) 5 MG tablet     Multiple Vitamins-Minerals (CENTRUM SILVER) per tablet     LORazepam (ATIVAN) 0.5 MG tablet     MYFORTIC 360 MG PO EC TABLET     No current facility-administered medications for this visit.         Gena Michel MD

## 2017-09-13 NOTE — PROGRESS NOTES
Nephrology Clinic Progress Note      Interval History : Mr Grande denies any new complaints. He says he has been gaining weight. He denies any LE edema, shortness of breath, nausea, vomiting, abdominal pain, dizziness, lightheadedness. His tacrolimus has been discontinued since 4/17.     Assessment & Recommendations:   56 year old male status post liver transplant on 4/5/16 with ANA requiring dialysis, now off dialysis with initial recovery. Etiology of injury is felt ot be related to HRS. Has ongoing issues with urinary retention and hyperkalemia (6.3) in the setting of high level of CNI (17.1) - now resolved. He has also had some problems with urinary retention requiring placement of a Mckinnon catheter and was on tamsulosin however now significantly improved. Doing well overall.     1. CKD - Patient had underlying dialysis-requiring ANA after liver Tx with recovery. Cr of 1.5 and improved. This is likely his baseline with eGFR of ~ 48. In total, likely hemodynamic in etiology in the setting of liver transplant with partial recovery; etiology of ANA was felt to be related to HRS with CNI augmentation as well. UOP good. Electrolytes with no concerning values. No dizziness.   --Urine P/Cr ratio of 0.28g/g. Will continue to monitor.   2. HTN/Volume: his blood pressures is optimal without antihypertensives. Currently appears euvolemic.   3. Anemia - Hb now at 12.  4. Bone and Mineral Disorder - Phos returned at 3.5 and acceptable.  5. OTLtx - 4/5; per Hepatology. He is now off tacrolimus and on MMF.     RTC in 6 month  No changes made in care plan today.     Gena Michel MD     Review of Systems:   A 4 point review of systems was negative except as noted above.    Physical Exam:   /85  Pulse 70  SpO2 96%  Exam:  Constitutional: healthy, alert and no distress  Head: Normocephalic.   ENT: ENT exam normal, no neck nodes or sinus tenderness  Cardiovascular: negative, PMI normal. No lifts, heaves, or thrills. RRR.  No murmurs, clicks gallops or rub  Respiratory: negative, Percussion normal. Good diaphragmatic excursion. Lungs clear  Gastrointestinal: Abdomen soft, non-tender. BS normal. No masses, organomegaly  : Deferred  Musculoskeletal: extremities normal- no gross deformities noted, gait normal and normal muscle tone. Trace LE edema   Skin: no suspicious lesions or rashes  Psychiatric: mentation appears normal and affect normal/bright    Labs:   Last Basic Metabolic Panel:  Lab Results   Component Value Date     09/13/2017      Lab Results   Component Value Date    POTASSIUM 4.6 09/13/2017     Lab Results   Component Value Date    CHLORIDE 104 09/13/2017     Lab Results   Component Value Date    CLAUDETTE 9.1 09/13/2017     Lab Results   Component Value Date    CO2 27 09/13/2017     Lab Results   Component Value Date    BUN 16 09/13/2017     Lab Results   Component Value Date    CR 1.51 09/13/2017     Lab Results   Component Value Date    GLC 94 09/13/2017         Current Medications:  Current Outpatient Prescriptions   Medication     clindamycin (CLEOCIN) 300 MG capsule     EPINEPHrine (EPIPEN 2-CHEPE) 0.3 MG/0.3ML injection 2-pack     mycophenolate (CELLCEPT - GENERIC EQUIVALENT) 500 MG tablet     sildenafil (REVATIO/VIAGRA) 20 MG tablet     Ascorbic Acid (VITAMIN C PO)     acetaminophen (MAPAP) 325 MG tablet     predniSONE (DELTASONE) 5 MG tablet     Multiple Vitamins-Minerals (CENTRUM SILVER) per tablet     LORazepam (ATIVAN) 0.5 MG tablet     MYFORTIC 360 MG PO EC TABLET     No current facility-administered medications for this visit.         Gena Michel MD     Answers for HPI/ROS submitted by the patient on 9/6/2017   General Symptoms: No  Skin Symptoms: No  HENT Symptoms: No  EYE SYMPTOMS: No  HEART SYMPTOMS: No  LUNG SYMPTOMS: No  INTESTINAL SYMPTOMS: No  URINARY SYMPTOMS: No  REPRODUCTIVE SYMPTOMS: No  SKELETAL SYMPTOMS: No  BLOOD SYMPTOMS: No  NERVOUS SYSTEM SYMPTOMS: No  MENTAL HEALTH SYMPTOMS: No

## 2017-09-13 NOTE — MR AVS SNAPSHOT
After Visit Summary   9/13/2017    Yung Grande    MRN: [id number]           Patient Information     Date Of Birth          1960        Visit Information        Provider Department      9/13/2017 12:30 PM Gena Michel MD [facility] Nephrology        Care Instructions    Please call Nain if you have questions: [phone]            Follow-ups after your visit        Follow-up notes from your care team     Return in about 6 months (around 3/13/2018).      Your next 10 appointments already scheduled     Jan 19, 2018 10:30 AM CST   (Arrive by 10:15 AM)   Return Liver Transplant with Stefany Rand MD   [facility] Hepatology ([facility])    [address] [place] [id number] [phone]            Apr 05, 2018 11:00 AM CDT   Lab with  LAB   [facility] Lab ([facility])    [address] [place] [id number] [phone]            Apr 05, 2018 12:00 PM CDT   (Arrive by 11:30 AM)   Return Visit with Gena Michel MD   [facility] Nephrology ([facility])    [address] [place] [id number] [phone]              Who to contact     If you have questions or need follow up information about today's clinic visit or your schedule please contact [facility] NEPHROLOGY directly at [phone].  Normal or non-critical lab and imaging results will be communicated to you by MyChart, letter or phone within 4 business days after the clinic has received the results. If you do not hear from us within 7 days, please contact the clinic through MyChart or phone. If you have a critical or abnormal lab result, we will notify you by phone as soon as possible.  Submit refill requests through [email or web address] or call your pharmacy and they will forward the refill request to us. Please allow 3 business days for your refill to be completed.           Additional Information About Your Visit        MyChart Information     [address] gives you secure access to your electronic health record. If you see a primary care provider, you can also send messages to your care team and make appointments. If you have questions, please call your primary care clinic.  If you do not have a primary care provider, please call [phone] and they will assist you.        Care EveryWhere ID     This is your Care EveryWhere ID. This could be used by other organizations to access your [place] medical records  [id number]        Your Vitals Were     Pulse Pulse Oximetry                70 96%           Blood Pressure from Last 3 Encounters:   09/13/17 122/85   07/18/17 (!) 136/92   04/18/17 (!) 137/93    Weight from Last 3 Encounters:   07/18/17 117.8 kg (259 lb 12.8 oz)   04/18/17 121.5 kg (267 lb 12.8 oz)   03/09/17 117.5 kg (259 lb)              Today, you had the following     No orders found for display       Primary Care Provider Office Phone # Fax #    Anthony Weinstein [phone]       [facility] [address]  [facility] [address]        Equal Access to Services     YVAN PETERS : Hadii aad ku hadasho Soomaali, waaxda luqadaha, qaybta kaalmada adeegyada, kahlil soares. So [facility] [phone].    ATENCIÓN: Si habla español, tiene a chavez disposición servicios gratuitos de asistencia lingüística. [facility] [phone].    We comply with applicable federal civil rights laws and [place] laws. We do not discriminate on the basis of race, color, national origin, age, disability sex, sexual orientation or gender identity.            Thank you!     Thank you for choosing [facility] NEPHROLOGY  for your care. Our goal is always to provide you with excellent care. Hearing back from our patients is one way we can continue to improve our services. Please take a few minutes to complete the written survey that you may receive in the  mail after your visit with us. Thank you!             Your Updated Medication List - Protect others around you: Learn how to safely use, store and throw away your medicines at www.disposemymeds.org.          This list is accurate as of: 9/13/17 12:54 PM.  Always use your most recent med list.                   Brand Name Dispense Instructions for use Diagnosis    CENTRUM SILVER per tablet      Take 1 tablet by mouth daily        clindamycin 300 MG capsule    CLEOCIN     as needed    Status post liver transplantation (H)       EPIPEN 2-CHEPE 0.3 MG/0.3ML injection 2-pack   Generic drug:  EPINEPHrine      Inject 0.3 mg into the muscle    Status post liver transplantation (H)       LORazepam 0.5 MG tablet    ATIVAN    2 tablet    Take 1 tablet (0.5 mg) by mouth every 8 hours as needed for anxiety (Take one tab at home and repeat as needed.)    Status post liver transplantation (H)       MAPAP 325 MG tablet   Generic drug:  acetaminophen      Take 650 mg by mouth        mycophenolate 500 MG tablet    GENERIC EQUIVALENT     Take 360 mg by mouth 2 times daily    Status post liver transplantation (H)       mycophenolic acid EC tablet     120 tablet    Take 2 tablets (720 mg) by mouth 2 times daily    Liver transplanted (H)       predniSONE 5 MG tablet    DELTASONE    30 tablet    Take 1 tablet (5 mg) by mouth daily    Liver transplanted (H)       sildenafil 20 MG tablet    REVATIO/VIAGRA     Take 20 mg by mouth as needed (prior to sexual activity)        VITAMIN C PO      Take 500 mg by mouth daily

## 2017-10-23 DIAGNOSIS — Z94.4 LIVER REPLACED BY TRANSPLANT (H): Primary | ICD-10-CM

## 2017-12-30 ENCOUNTER — MYC REFILL (OUTPATIENT)
Dept: GASTROENTEROLOGY | Facility: CLINIC | Age: 57
End: 2017-12-30

## 2017-12-30 DIAGNOSIS — Z94.4 LIVER TRANSPLANTED (H): ICD-10-CM

## 2018-01-02 RX ORDER — MYCOPHENOLIC ACID 360 MG/1
720 TABLET, DELAYED RELEASE ORAL 2 TIMES DAILY
Qty: 120 TABLET | Refills: 11 | Status: SHIPPED | OUTPATIENT
Start: 2018-01-02 | End: 2019-01-02

## 2018-01-02 RX ORDER — PREDNISONE 5 MG/1
5 TABLET ORAL DAILY
Qty: 30 TABLET | Refills: 11 | Status: SHIPPED | OUTPATIENT
Start: 2018-01-02 | End: 2018-12-06

## 2018-01-02 NOTE — TELEPHONE ENCOUNTER
Message from [facility]:  Mel Huber LPN Tue Jan 2, 2018 8:22 AM        ----- Message -----   From: Yung Grande   Sent: 12/30/2017 12:21 PM   To: Hepatology Nurses-  Subject: Medication Renewal Request     Original authorizing provider: MD Yung Puri would like a refill of the following medications:  MYFORTIC 360 MG PO EC TABLET [Stefany Rand MD]  predniSONE (DELTASONE) 5 MG tablet [Stefany Rand MD]    Preferred pharmacy: [facility] - [place], [place] - [address]    Comment:  out of refills

## 2018-01-18 NOTE — PROGRESS NOTES
A/P    57 year old male s/p LT for ETOH 2016. On MMF and prednisone. Kidney function stable and follows with Dr. Michel. Tolerating MMF and pred well. Excellent liver function.    Discussed weight and exercise again. He was obese prior to getting sick with liver disease.      Medically doing extremely well otherwise    Stopped iron as his iron levels are sufficient.  Labs up to date as is cancer screening.     Discussed long term risks of immunosuppression. Discussed risk of skin cancer and recommendation to see derm.     Due for DEXA (ordered)     RTC 1 year.     This was a 30 minute visit, over 50% counseling and coordination of care.   ==============================================  S: 57 year old male s/p DDLT 16 for ETOH.  EXPLANT: Steatosis, no HCC, PV thrombus.  IS: MMF and prednisone. Taken off tac for kidney dysfunction. Had mouth sores with everolimus.  LABS: Up to date, liver tests normal 1/10/18  REJECTION: None.  BILIARY ISSUES: None  STENT: Out on 16  KIDNEY FUNCTION: creat 1.70 on 17     BP: Good. No meds.  PREV: UTD on screening. Colonoscopy  3 polyps. Repeat 3-5 years.   Derm not done   Flu shot done this year  Dexa   normal  DISEASE RECURRENCE: No alcohol for several years.  OTHER ISSUES: ongoing mild anemia that has improved and almost normalized, on iron. Hgb 13.1  Had a spot in transplanted liver on CT. Follow up MRI showed hemangioma     NEW ISSUES: Had LBP, saw PCP, got PT. Helping a lot. Still struggling with weight. Was about 250-275 pre-transplant.      SOC: , here with wife. Back to work full time as a . His sister  from liver disease (probably LASSITER) and multifocal HCC in the last year.     ROS: 10 point ROS neg other than the symptoms noted above in the HPI. Had a sensation in his abdomen after LT that was there for months, now gone.      O  BP (!) 138/94 (BP Location: Right arm, Patient Position: Sitting, Cuff Size: Adult Large)  Pulse 81  Temp  "98.8  F (37.1  C) (Oral)  Ht 1.753 m (5' 9.02\")  Wt 125.5 kg (276 lb 9.6 oz)  SpO2 97%  BMI 40.82 kg/m2  GENERAL:  Very pleasant, well-appearing, in no acute distress.    HEENT:  No icterus, no oral lesions.    LYMPH:  No supraclavicular or cervical lymphadenopathy.    CARDIOVASCULAR:  Regular rate and rhythm.    CHEST:  Lungs are clear.    ABDOMEN:  Bowel sounds are present.  Abdomen is soft, nontender, nondistended.  Scar is well healed.      EXTREMITIES:  No edema.    SKIN:  No rash.    NEUROLOGIC:  Speech is fluent and clear.  No asterixis or tremor.         Current Outpatient Prescriptions   Medication     clindamycin (CLEOCIN) 300 MG capsule     EPINEPHrine (EPIPEN 2-CHEPE) 0.3 MG/0.3ML injection 2-pack     mycophenolate (CELLCEPT - GENERIC EQUIVALENT) 500 MG tablet     LORazepam (ATIVAN) 0.5 MG tablet     sildenafil (REVATIO/VIAGRA) 20 MG tablet     Ascorbic Acid (VITAMIN C PO)     acetaminophen (MAPAP) 325 MG tablet     MYFORTIC 360 MG PO EC TABLET     predniSONE (DELTASONE) 5 MG tablet     Multiple Vitamins-Minerals (CENTRUM SILVER) per tablet      No current facility-administered medications for this visit.            "

## 2018-01-19 ENCOUNTER — OFFICE VISIT (OUTPATIENT)
Dept: GASTROENTEROLOGY | Facility: CLINIC | Age: 58
End: 2018-01-19
Attending: INTERNAL MEDICINE
Payer: COMMERCIAL

## 2018-01-19 VITALS
OXYGEN SATURATION: 97 % | HEIGHT: 69 IN | SYSTOLIC BLOOD PRESSURE: 138 MMHG | DIASTOLIC BLOOD PRESSURE: 94 MMHG | WEIGHT: 276.6 LBS | HEART RATE: 81 BPM | TEMPERATURE: 98.8 F | BODY MASS INDEX: 40.97 KG/M2

## 2018-01-19 DIAGNOSIS — Z94.4 STATUS POST LIVER TRANSPLANTATION (H): ICD-10-CM

## 2018-01-19 PROCEDURE — G0463 HOSPITAL OUTPT CLINIC VISIT: HCPCS | Mod: ZF

## 2018-01-19 RX ORDER — CLINDAMYCIN HCL 300 MG
CAPSULE ORAL PRN
Status: CANCELLED | OUTPATIENT
Start: 2018-01-19

## 2018-01-19 ASSESSMENT — PAIN SCALES - GENERAL: PAINLEVEL: NO PAIN (0)

## 2018-01-19 NOTE — NURSING NOTE
"Chief Complaint   Patient presents with     RECHECK     liver failure       Initial BP (!) 138/94 (BP Location: Right arm, Patient Position: Sitting, Cuff Size: Adult Large)  Pulse 81  Temp 98.8  F (37.1  C) (Oral)  Ht 1.753 m (5' 9.02\")  Wt 125.5 kg (276 lb 9.6 oz)  SpO2 97%  BMI 40.82 kg/m2 Estimated body mass index is 40.82 kg/(m^2) as calculated from the following:    Height as of this encounter: 1.753 m (5' 9.02\").    Weight as of this encounter: 125.5 kg (276 lb 9.6 oz).  Medication Reconciliation: complete     Devorah Pizano MA    "

## 2018-01-19 NOTE — MR AVS SNAPSHOT
After Visit Summary   1/19/2018    Yung Grande    MRN: [id number]           Patient Information     Date Of Birth          1960        Visit Information        Provider Department      1/19/2018 10:30 AM Stefany Rand MD [facility] Hepatology        Today's Diagnoses     Status post liver transplantation (H)           Follow-ups after your visit        Additional Services     DERMATOLOGY REFERRAL       Your provider has referred you to: [facility]: Dermatology Clinic [facility] [phone]   [email or web address]    Please be aware that coverage of these services is subject to the terms and limitations of your health insurance plan.  Call member services at your health plan with any benefit or coverage questions.      Please bring the following with you to your appointment:    (1) Any X-Rays, CTs or MRIs which have been performed.  Contact the facility where they were done to arrange for  prior to your scheduled appointment.    (2) List of current medications  (3) This referral request   (4) Any documents/labs given to you for this referral                  Follow-up notes from your care team     Return in about 1 year (around 1/19/2019).      Your next 10 appointments already scheduled     Apr 19, 2018  1:45 PM CDT   Lab with  LAB   [facility]    [address]  1st Floor  [facility] [address] [phone]            Apr 19, 2018  2:00 PM CDT   (Arrive by 1:45 PM)   New Patient Visit with Geoff Anderson MD   [facility]at[facility] ([facility])    [address]  3rd Floor  [facility] [address] [phone]            Apr 19, 2018  3:00 PM CDT   (Arrive by 2:30 PM)   Return Visit with Gena Michel MD   [facility]ph[facility] ([facility])    [address]  [address]  [facility] [address] [phone]         "    Jan 11, 2019 10:40 AM CST   (Arrive by 10:25 AM)   Return Liver Transplant with Stefany Rand MD   [facility] Hepatology ([facility])    [address]  [address]  [facility] [place] [address] [phone]              Future tests that were ordered for you today     Open Future Orders        Priority Expected Expires Ordered    Dexa hip/pelvis/spine with lateral Routine  2/18/2018 1/19/2018            Who to contact     If you have questions or need follow up information about today's clinic visit or your schedule please contact [facility] HEPATOLOGY directly at [phone].  Normal or non-critical lab and imaging results will be communicated to you by MyWavehart, letter or phone within 4 business days after the clinic has received the results. If you do not hear from us within 7 days, please contact the clinic through [employer] or phone. If you have a critical or abnormal lab result, we will notify you by phone as soon as possible.  Submit refill requests through [employer] or call your pharmacy and they will forward the refill request to us. Please allow 3 business days for your refill to be completed.          Additional Information About Your Visit        PeopleCube Information     [employer] gives you secure access to your electronic health record. If you see a primary care provider, you can also send messages to your care team and make appointments. If you have questions, please call your primary care clinic.  If you do not have a primary care provider, please call [phone] and they will assist you.        Care EveryWhere ID     This is your Care EveryWhere ID. This could be used by other organizations to access your [place] medical records  [id number]        Your Vitals Were     Pulse Temperature Height Pulse Oximetry BMI (Body Mass Index)       81 98.8  F (37.1  C) (Oral) 1.753 m (5' 9.02\") 97% 40.82 kg/m2        Blood Pressure from Last 3 Encounters:   01/19/18 (!) " 138/94   09/13/17 122/85   07/18/17 (!) 136/92    Weight from Last 3 Encounters:   01/19/18 125.5 kg (276 lb 9.6 oz)   07/18/17 117.8 kg (259 lb 12.8 oz)   04/18/17 121.5 kg (267 lb 12.8 oz)              We Performed the Following     DERMATOLOGY REFERRAL        Primary Care Provider Office Phone # Fax #    Anthony Weinstein [phone]       [facility] [address]  [facility] [address]        Equal Access to Services     [facility] : Hadii aad ku hadasho Solynda, waaxda luqadaha, qaybta kaalmada adesandie, kahlil dominguez . So [facility] [phone].    ATENCIÓN: Si habla español, tiene a chavez disposición servicios gratuitos de asistencia lingüística. [facility] [phone].    We comply with applicable federal civil rights laws and [place] laws. We do not discriminate on the basis of race, color, national origin, age, disability, sex, sexual orientation, or gender identity.            Thank you!     Thank you for choosing [facility] HEPATOLOGY  for your care. Our goal is always to provide you with excellent care. Hearing back from our patients is one way we can continue to improve our services. Please take a few minutes to complete the written survey that you may receive in the mail after your visit with us. Thank you!             Your Updated Medication List - Protect others around you: Learn how to safely use, store and throw away your medicines at www.disposemymeds.org.          This list is accurate as of: 1/19/18 11:33 AM.  Always use your most recent med list.                   Brand Name Dispense Instructions for use Diagnosis    CENTRUM SILVER per tablet      Take 1 tablet by mouth daily        EPIPEN 2-CHEPE 0.3 MG/0.3ML injection 2-pack   Generic drug:  EPINEPHrine      Inject 0.3 mg into the muscle    Status post liver transplantation (H)       LORazepam 0.5 MG tablet    ATIVAN    2 tablet    Take 1 tablet (0.5 mg) by mouth every 8 hours as needed for  anxiety (Take one tab at home and repeat as needed.)    Status post liver transplantation (H)       MAPAP 325 MG tablet   Generic drug:  acetaminophen      Take 650 mg by mouth        mycophenolate 500 MG tablet    GENERIC EQUIVALENT     Take 360 mg by mouth 2 times daily    Status post liver transplantation (H)       mycophenolic acid 360 MG EC tablet     120 tablet    Take 2 tablets (720 mg) by mouth 2 times daily    Liver transplanted (H)       predniSONE 5 MG tablet    DELTASONE    30 tablet    Take 1 tablet (5 mg) by mouth daily    Liver transplanted (H)       sildenafil 20 MG tablet    REVATIO     Take 20 mg by mouth as needed (prior to sexual activity)        VITAMIN C PO      Take 500 mg by mouth daily

## 2018-01-19 NOTE — NURSING NOTE
Here for post liver transplant follow-up.  Accompanied by wife Daiana.  Doing well but has gained a lot of wait.  We cautioned him about this.  Creat remains elevated, he follows w/ Dr. Michel.  Reviewed recent labs and assisted with interpretation.   Due for DEXA scan.  Told him he can do at local clinic.    Complaints:  Weight gain    Current immunosuppression:    Myfortic / pred    Med changes: none.     Lab frequency:  q 2-3 mos    Follow-up:  Hepatology, derm and PCP annually. Reviewed need for annual follow-up here with hepatology and dermatology.

## 2018-01-19 NOTE — LETTER
2018      RE: Yung Grande  [address] [place] [address]       A/P    57 year old male s/p LT for ETOH 2016. On MMF and prednisone. Kidney function stable and follows with Dr. Michel. Tolerating MMF and pred well. Excellent liver function.    Discussed weight and exercise again. He was obese prior to getting sick with liver disease.      Medically doing extremely well otherwise    Stopped iron as his iron levels are sufficient.  Labs up to date as is cancer screening.     Discussed long term risks of immunosuppression. Discussed risk of skin cancer and recommendation to see derm.     Janis for DEXA (ordered)     RTC 1 year.     This was a 30 minute visit, over 50% counseling and coordination of care.   ==============================================  S: 57 year old male s/p DDLT 16 for ETOH.  EXPLANT: Steatosis, no HCC, PV thrombus.  IS: MMF and prednisone. Taken off tac for kidney dysfunction. Had mouth sores with everolimus.  LABS: Up to date, liver tests normal 1/10/18  REJECTION: None.  BILIARY ISSUES: None  STENT: Out on 16  KIDNEY FUNCTION: creat 1.70 on 17     BP: Good. No meds.  PREV: UTD on screening. Colonoscopy  3 polyps. Repeat 3-5 years.   Derm not done   Flu shot done this year  Dexa   normal  DISEASE RECURRENCE: No alcohol for several years.  OTHER ISSUES: ongoing mild anemia that has improved and almost normalized, on iron. Hgb 13.1  Had a spot in transplanted liver on CT. Follow up MRI showed hemangioma     NEW ISSUES: Had LBP, saw PCP, got PT. Helping a lot. Still struggling with weight. Was about 250-275 pre-transplant.      SOC: , here with wife. Back to work full time as a . His sister  from liver disease (probably LASSITER) and multifocal HCC in the last year.     ROS: 10 point ROS neg other than the symptoms noted above in the HPI. Had a sensation in his abdomen after LT that was there for months, now gone.      O  BP (!) 138/94 (BP  "Location: Right arm, Patient Position: Sitting, Cuff Size: Adult Large)  Pulse 81  Temp 98.8  F (37.1  C) (Oral)  Ht 1.753 m (5' 9.02\")  Wt 125.5 kg (276 lb 9.6 oz)  SpO2 97%  BMI 40.82 kg/m2  GENERAL:  Very pleasant, well-appearing, in no acute distress.    HEENT:  No icterus, no oral lesions.    LYMPH:  No supraclavicular or cervical lymphadenopathy.    CARDIOVASCULAR:  Regular rate and rhythm.    CHEST:  Lungs are clear.    ABDOMEN:  Bowel sounds are present.  Abdomen is soft, nontender, nondistended.  Scar is well healed.      EXTREMITIES:  No edema.    SKIN:  No rash.    NEUROLOGIC:  Speech is fluent and clear.  No asterixis or tremor.         Current Outpatient Prescriptions   Medication     clindamycin (CLEOCIN) 300 MG capsule     EPINEPHrine (EPIPEN 2-CHEPE) 0.3 MG/0.3ML injection 2-pack     mycophenolate (CELLCEPT - GENERIC EQUIVALENT) 500 MG tablet     LORazepam (ATIVAN) 0.5 MG tablet     sildenafil (REVATIO/VIAGRA) 20 MG tablet     Ascorbic Acid (VITAMIN C PO)     acetaminophen (MAPAP) 325 MG tablet     MYFORTIC 360 MG PO EC TABLET     predniSONE (DELTASONE) 5 MG tablet     Multiple Vitamins-Minerals (CENTRUM SILVER) per tablet      No current facility-administered medications for this visit.              Stefany Rand MD      "

## 2018-04-13 ENCOUNTER — TELEPHONE (OUTPATIENT)
Dept: NEUROLOGY | Facility: CLINIC | Age: 58
End: 2018-04-13

## 2018-04-13 DIAGNOSIS — N18.30 CKD (CHRONIC KIDNEY DISEASE) STAGE 3, GFR 30-59 ML/MIN (H): Primary | ICD-10-CM

## 2018-04-13 NOTE — TELEPHONE ENCOUNTER
Health Call Center    Phone Message    May a detailed message be left on voicemail: yes    Reason for Call: Order(s): Other:   Reason for requested: Dr. Michel's patient, pt would like to do labs locally. Per pt, wanted to keep lab appt here in case labs weren't entered before appt. Please enter orders for patient.  Date needed: ASAP, appt with Dr. Michel is this coming Thursday (04/19/2018)  Provider name: Dr. Michel      Action Taken: Message routed to:  [facility] ([facility] Nephrology

## 2018-04-16 ENCOUNTER — TELEPHONE (OUTPATIENT)
Dept: DERMATOLOGY | Facility: CLINIC | Age: 58
End: 2018-04-16

## 2018-04-16 NOTE — TELEPHONE ENCOUNTER
Left message for Yung reminding of appointment date and time. Asked that they bring an updated list of medications and any records pertaining to this visit.     Clinic number provided to call back in case this appointment needs to be canceled.

## 2018-04-17 ENCOUNTER — TELEPHONE (OUTPATIENT)
Dept: TRANSPLANT | Facility: CLINIC | Age: 58
End: 2018-04-17

## 2018-04-17 DIAGNOSIS — Z94.4 LIVER REPLACED BY TRANSPLANT (H): Primary | ICD-10-CM

## 2018-04-17 ASSESSMENT — ENCOUNTER SYMPTOMS
SINUS CONGESTION: 0
NECK MASS: 0
DOUBLE VISION: 0
EYE PAIN: 0
SORE THROAT: 0
EYE WATERING: 1
EYE IRRITATION: 0
TROUBLE SWALLOWING: 0
TASTE DISTURBANCE: 0
SINUS PAIN: 0
EYE REDNESS: 0
SMELL DISTURBANCE: 0
HOARSE VOICE: 0

## 2018-04-17 NOTE — LETTER
OUTPATIENT OR TRANSITIONAL CARE  LABORATORY TEST ORDER  [facility] [place]: [phone]    Patient Name: Yung Grande  Transplant Date: 4/5/2016   YOB: 1960  Issue Date: 04/17/18   [facility] MR#: [id number]  Expiration Date: 04/17/19       Diagnoses: [x]      Liver Transplant (ICD-10 Z94.4)    [x]      Long term use of medications (ICD-10 Z79.899)     Please fax results to [phone]     Frequency: once with next labs        [x] Ethyl glucuronide    If you have questions, please call [phone] or [phone].    .

## 2018-04-17 NOTE — TELEPHONE ENCOUNTER
Provider Call: Provider Call: Transplant Lab/Orders  Route to LPN  Post Transplant Days: 742  When patient is less than 60 days post-transplant, route high priority  Reason for Call: Annual lab reorder  Liver patients reporting abnormal lab results: Route to RN and Page  Document lab facility information when provider is calling about annual lab orders. Delete facility wildcards when not needed.  Facility Name: [facility]  Facility Location: [facility] Fax Number: [phone]  Callback needed? No

## 2018-04-18 DIAGNOSIS — N18.30 CKD (CHRONIC KIDNEY DISEASE) STAGE 3, GFR 30-59 ML/MIN (H): Primary | ICD-10-CM

## 2018-04-19 ENCOUNTER — OFFICE VISIT (OUTPATIENT)
Dept: DERMATOLOGY | Facility: CLINIC | Age: 58
End: 2018-04-19
Attending: INTERNAL MEDICINE
Payer: COMMERCIAL

## 2018-04-19 ENCOUNTER — OFFICE VISIT (OUTPATIENT)
Dept: NEPHROLOGY | Facility: CLINIC | Age: 58
End: 2018-04-19
Attending: INTERNAL MEDICINE
Payer: COMMERCIAL

## 2018-04-19 VITALS
BODY MASS INDEX: 42 KG/M2 | OXYGEN SATURATION: 99 % | SYSTOLIC BLOOD PRESSURE: 129 MMHG | DIASTOLIC BLOOD PRESSURE: 92 MMHG | HEIGHT: 69 IN | WEIGHT: 283.6 LBS | HEART RATE: 90 BPM

## 2018-04-19 DIAGNOSIS — L73.9 FOLLICULITIS: Primary | ICD-10-CM

## 2018-04-19 DIAGNOSIS — N18.30 CKD (CHRONIC KIDNEY DISEASE) STAGE 3, GFR 30-59 ML/MIN (H): ICD-10-CM

## 2018-04-19 DIAGNOSIS — L82.0 INFLAMED SEBORRHEIC KERATOSIS: ICD-10-CM

## 2018-04-19 DIAGNOSIS — Z94.4 LIVER REPLACED BY TRANSPLANT (H): ICD-10-CM

## 2018-04-19 DIAGNOSIS — D50.9 IRON DEFICIENCY ANEMIA, UNSPECIFIED IRON DEFICIENCY ANEMIA TYPE: Primary | ICD-10-CM

## 2018-04-19 PROBLEM — N25.81 SECONDARY RENAL HYPERPARATHYROIDISM (H): Status: ACTIVE | Noted: 2018-04-19

## 2018-04-19 LAB
ALBUMIN SERPL-MCNC: 4.2 G/DL (ref 3.4–5)
ALBUMIN UR-MCNC: NEGATIVE MG/DL
ANION GAP SERPL CALCULATED.3IONS-SCNC: 8 MMOL/L (ref 3–14)
APPEARANCE UR: CLEAR
BILIRUB UR QL STRIP: NEGATIVE
BUN SERPL-MCNC: 17 MG/DL (ref 7–30)
CALCIUM SERPL-MCNC: 9 MG/DL (ref 8.5–10.1)
CHLORIDE SERPL-SCNC: 105 MMOL/L (ref 94–109)
CO2 SERPL-SCNC: 25 MMOL/L (ref 20–32)
COLOR UR AUTO: ABNORMAL
CREAT SERPL-MCNC: 1.46 MG/DL (ref 0.66–1.25)
CREAT UR-MCNC: 18 MG/DL
FERRITIN SERPL-MCNC: 827 NG/ML (ref 26–388)
GFR SERPL CREATININE-BSD FRML MDRD: 50 ML/MIN/1.7M2
GLUCOSE SERPL-MCNC: 94 MG/DL (ref 70–99)
GLUCOSE UR STRIP-MCNC: NEGATIVE MG/DL
HGB UR QL STRIP: NEGATIVE
IRON SATN MFR SERPL: 22 % (ref 15–46)
IRON SERPL-MCNC: 70 UG/DL (ref 35–180)
KETONES UR STRIP-MCNC: NEGATIVE MG/DL
LEUKOCYTE ESTERASE UR QL STRIP: NEGATIVE
MUCOUS THREADS #/AREA URNS LPF: PRESENT /LPF
NITRATE UR QL: NEGATIVE
PH UR STRIP: 6 PH (ref 5–7)
PHOSPHATE SERPL-MCNC: 3 MG/DL (ref 2.5–4.5)
POTASSIUM SERPL-SCNC: 4.5 MMOL/L (ref 3.4–5.3)
PROT UR-MCNC: <0.05 G/L
PROT/CREAT 24H UR: NORMAL G/G CR (ref 0–0.2)
PTH-INTACT SERPL-MCNC: 106 PG/ML (ref 18–80)
RBC #/AREA URNS AUTO: <1 /HPF (ref 0–2)
SODIUM SERPL-SCNC: 138 MMOL/L (ref 133–144)
SOURCE: ABNORMAL
SP GR UR STRIP: 1 (ref 1–1.03)
TIBC SERPL-MCNC: 316 UG/DL (ref 240–430)
UROBILINOGEN UR STRIP-MCNC: 0 MG/DL (ref 0–2)
WBC #/AREA URNS AUTO: <1 /HPF (ref 0–5)

## 2018-04-19 PROCEDURE — 80069 RENAL FUNCTION PANEL: CPT | Performed by: INTERNAL MEDICINE

## 2018-04-19 PROCEDURE — 80321 ALCOHOLS BIOMARKERS 1OR 2: CPT | Performed by: INTERNAL MEDICINE

## 2018-04-19 PROCEDURE — 82306 VITAMIN D 25 HYDROXY: CPT | Performed by: INTERNAL MEDICINE

## 2018-04-19 PROCEDURE — G0463 HOSPITAL OUTPT CLINIC VISIT: HCPCS | Mod: ZF

## 2018-04-19 PROCEDURE — 83540 ASSAY OF IRON: CPT | Performed by: INTERNAL MEDICINE

## 2018-04-19 PROCEDURE — 82728 ASSAY OF FERRITIN: CPT | Performed by: INTERNAL MEDICINE

## 2018-04-19 PROCEDURE — 83550 IRON BINDING TEST: CPT | Performed by: INTERNAL MEDICINE

## 2018-04-19 PROCEDURE — 81001 URINALYSIS AUTO W/SCOPE: CPT | Performed by: INTERNAL MEDICINE

## 2018-04-19 PROCEDURE — 36415 COLL VENOUS BLD VENIPUNCTURE: CPT | Performed by: INTERNAL MEDICINE

## 2018-04-19 PROCEDURE — 84156 ASSAY OF PROTEIN URINE: CPT | Performed by: INTERNAL MEDICINE

## 2018-04-19 PROCEDURE — 83970 ASSAY OF PARATHORMONE: CPT | Performed by: INTERNAL MEDICINE

## 2018-04-19 RX ORDER — FERROUS SULFATE 325(65) MG
1 TABLET ORAL
Qty: 100 TABLET | Refills: 3
Start: 2018-04-19 | End: 2018-11-07

## 2018-04-19 RX ORDER — CLINDAMYCIN PHOSPHATE 10 UG/ML
LOTION TOPICAL 2 TIMES DAILY
Qty: 60 ML | Refills: 1 | Status: SHIPPED | OUTPATIENT
Start: 2018-04-19 | End: 2019-09-12

## 2018-04-19 ASSESSMENT — PAIN SCALES - GENERAL
PAINLEVEL: NO PAIN (0)
PAINLEVEL: NO PAIN (0)

## 2018-04-19 NOTE — MR AVS SNAPSHOT
After Visit Summary   4/19/2018    Yung Grande    MRN: [id number]           Patient Information     Date Of Birth          1960        Visit Information        Provider Department      4/19/2018 2:15 PM Geoff Anderson MD [facility] Dermatology        Today's Diagnoses     Folliculitis    -  1      Care Instructions    Cryotherapy    What is it?    Use of a very cold liquid, such as liquid nitrogen, to freeze and destroy abnormal skin cells that need to be removed    What should I expect?    Tenderness and redness    A small blister that might grow and fill with dark purple blood. There may be crusting.    More than one treatment may be needed if the lesions do not go away.    How do I care for the treated area?    Gently wash the area with your hands when bathing.    Use a thin layer of Vaseline to help with healing. You may use a Band-Aid.     The area should heal within 7-10 days and may leave behind a pink or lighter color.     Do not use an antibiotic or Neosporin ointment.     You may take acetaminophen (Tylenol) for pain.     Call your Doctor if you have:    Severe pain    Signs of infection (warmth, redness, cloudy yellow drainage, and or a bad smell)    Questions or concerns    Who should I call with questions?       [facility]: [phone]       [facility]: [phone]       For urgent needs outside of business hours call the [facility] at [phone]        and ask for the dermatology resident on call            Follow-ups after your visit        Your next 10 appointments already scheduled     Jan 11, 2019 10:30 AM CST   (Arrive by 10:15 AM)   Return Liver Transplant with Stefany Rand MD   [facility] Hepatology ([facility])    [address] [place] [address]  [address]  [facility] [address] [phone]              Future tests that were ordered for you today     Open Future  Orders        Priority Expected Expires Ordered    Lipid Profile Routine 4/19/2018 4/19/2019 4/19/2018            Who to contact     Please call your clinic at [phone] to:    Ask questions about your health    Make or cancel appointments    Discuss your medicines    Learn about your test results    Speak to your doctor            Additional Information About Your Visit        [employer] Information     [employer] gives you secure access to your electronic health record. If you see a primary care provider, you can also send messages to your care team and make appointments. If you have questions, please call your primary care clinic.  If you do not have a primary care provider, please call [phone] and they will assist you.      [employer] is an electronic gateway that provides easy, online access to your medical records. With [employer], you can request a clinic appointment, read your test results, renew a prescription or communicate with your care team.     To access your existing account, please contact your [facility] or call [phone] for assistance.        Care EveryWhere ID     This is your Care EveryWhere ID. This could be used by other organizations to access your [place] medical records  [id number]         Blood Pressure from Last 3 Encounters:   01/19/18 (!) 138/94   09/13/17 122/85   07/18/17 (!) 136/92    Weight from Last 3 Encounters:   01/19/18 125.5 kg (276 lb 9.6 oz)   07/18/17 117.8 kg (259 lb 12.8 oz)   04/18/17 121.5 kg (267 lb 12.8 oz)              Today, you had the following     No orders found for display         Today's Medication Changes          These changes are accurate as of 4/19/18  3:02 PM.  If you have any questions, ask your nurse or doctor.               Start taking these medicines.        Dose/Directions    clindamycin 1 % lotion   Commonly known as:  CLEOCIN T   Used for:  Folliculitis   Started by:  Geoff Anderson MD        Apply topically 2  times daily To areas of bumps on back of scalp   Quantity:  60 mL   Refills:  1            Where to get your medicines      These medications were sent to [facility] - [place], [place] - [address]  [address] [facility] [address]     Phone:  [phone]     clindamycin 1 % lotion                Primary Care Provider Office Phone # Fax #    Anthony Weinstein [phone]       [facility] [address]  [facility] [address]        Equal Access to Services     RAF PETERS : Hadii michelle ku hadasho Soomaali, waaxda luqadaha, qaybta kaalmada adeegyada, waxay hua hayira dominguez . So [facility] [phone].    ATENCIÓN: Si habla español, tiene a chavez disposición servicios gratuitos de asistencia lingüística. [facility] [phone].    We comply with applicable federal civil rights laws and [place] laws. We do not discriminate on the basis of race, color, national origin, age, disability, sex, sexual orientation, or gender identity.            Thank you!     Thank you for choosing [facility] DERMATOLOGY  for your care. Our goal is always to provide you with excellent care. Hearing back from our patients is one way we can continue to improve our services. Please take a few minutes to complete the written survey that you may receive in the mail after your visit with us. Thank you!             Your Updated Medication List - Protect others around you: Learn how to safely use, store and throw away your medicines at www.disposemymeds.org.          This list is accurate as of 4/19/18  3:02 PM.  Always use your most recent med list.                   Brand Name Dispense Instructions for use Diagnosis    CENTRUM SILVER per tablet      Take 1 tablet by mouth daily        clindamycin 1 % lotion    CLEOCIN T    60 mL    Apply topically 2 times daily To areas of bumps on back of scalp    Folliculitis       EPIPEN 2-CHEPE 0.3 MG/0.3ML injection 2-pack   Generic  drug:  EPINEPHrine      Inject 0.3 mg into the muscle    Status post liver transplantation (H)       LORazepam 0.5 MG tablet    ATIVAN    2 tablet    Take 1 tablet (0.5 mg) by mouth every 8 hours as needed for anxiety (Take one tab at home and repeat as needed.)    Status post liver transplantation (H)       MAPAP 325 MG tablet   Generic drug:  acetaminophen      Take 650 mg by mouth        mycophenolate 500 MG tablet    GENERIC EQUIVALENT     Take 360 mg by mouth 2 times daily    Status post liver transplantation (H)       mycophenolic acid 360 MG EC tablet     120 tablet    Take 2 tablets (720 mg) by mouth 2 times daily    Liver transplanted (H)       predniSONE 5 MG tablet    DELTASONE    30 tablet    Take 1 tablet (5 mg) by mouth daily    Liver transplanted (H)       sildenafil 20 MG tablet    REVATIO     Take 20 mg by mouth as needed (prior to sexual activity)        VITAMIN C PO      Take 500 mg by mouth daily

## 2018-04-19 NOTE — LETTER
"4/19/2018    RE: Yung Grande  [address] [address]       Nephrology Follow-up  4/19/2018  Primary Nephrologist Dr. Michel    Assessment & Recommendations:   57 year old male status post liver transplant on 4/5/16 with ANA requiring dialysis post transplant, recovery to CKD stage III. 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      1. 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  Mild proteinuria (currently too low to quantify), no hematuria.  -educated on risk factors for progression including obesity.  He is wanting to get into a program or begin lifestyle changes to address this but has not initiated as of yet.  -electrolytes within normal range.    2. HTN/Volume: Euvolemic. BPs at goal  -no medication.    3. Anemia - Hb now at 12, mild iron deficiency.  -will start Fe sulfate 325 mg once daily.  Advised of risk of constipation SE.    4. Bone and Mineral Disorder - PTH and vitamin D are pending, corrected calcium and phosphorus are within normal range.    Will develop plan based on results.    5. OTLtx - mgmt per Hepatology, on MMF and prednisone. .     RTC in 6 months to see Dr. Michel.  No changes made in care plan today.     Cecelia Angel PA-C  [facility]  [facility]  Department of Medicine  Division of Renal Disease and Hypertension  [phone]    Interval History:   Mr Grande denies any new complaints, he feels well and is grateful for his health after being so ill at time of liver transplant.  He says he has been gaining weight and is concerned about that. He reports he is fairly active, works on an \"estate\" and has to walk significant distances in between. He states will work on trying to implement dietary changes though.  He denies any LE edema, shortness of breath, nausea, vomiting, abdominal pain, dizziness, lightheadedness. His tacrolimus has been discontinued since " "4/2017.     Review of Systems:   Review of systems was negative except as noted above.    Physical Exam:   BP (!) 129/92  Pulse 90  Ht 1.753 m (5' 9.02\")  Wt 128.6 kg (283 lb 9.6 oz)  SpO2 99%  BMI 41.86 kg/m2     Exam:  Constitutional:  alert and no distress  Head: Normocephalic.   ENT: ENT exam normal, no neck nodes or sinus tenderness  Cardiovascular: negative, PMI normal. No lifts, heaves, or thrills. RRR. No murmurs, clicks gallops or rub  Respiratory: negative, Percussion normal. Good diaphragmatic excursion. Lungs clear  Gastrointestinal: Abdomen soft, non-tender. BS normal. No masses, organomegaly  : Deferred  Musculoskeletal: extremities normal- no gross deformities noted, gait normal and normal muscle tone. Trace LE edema   Skin: no suspicious lesions or rashes  Psychiatric: mentation appears normal and affect normal/bright    Labs:   Last Basic Metabolic Panel:  Lab Results   Component Value Date     09/13/2017      Lab Results   Component Value Date    POTASSIUM 4.6 09/13/2017     Lab Results   Component Value Date    CHLORIDE 104 09/13/2017     Lab Results   Component Value Date    CLAUDETTE 9.1 09/13/2017     Lab Results   Component Value Date    CO2 27 09/13/2017     Lab Results   Component Value Date    BUN 16 09/13/2017     Lab Results   Component Value Date    CR 1.51 09/13/2017     Lab Results   Component Value Date    GLC 94 09/13/2017     Current Medications:  Current Outpatient Prescriptions   Medication     acetaminophen (MAPAP) 325 MG tablet     Ascorbic Acid (VITAMIN C PO)     clindamycin (CLEOCIN T) 1 % lotion     EPINEPHrine (EPIPEN 2-CHEPE) 0.3 MG/0.3ML injection 2-pack     ferrous sulfate (IRON) 325 (65 Fe) MG tablet     Multiple Vitamins-Minerals (CENTRUM SILVER) per tablet     mycophenolate (CELLCEPT - GENERIC EQUIVALENT) 500 MG tablet     MYFORTIC (BRAND) 360 MG EC TABLET     predniSONE (DELTASONE) 5 MG tablet     sildenafil (REVATIO/VIAGRA) 20 MG tablet     LORazepam (ATIVAN) " 0.5 MG tablet     No current facility-administered medications for this visit.       Cecelia Angel PA-C

## 2018-04-19 NOTE — PATIENT INSTRUCTIONS
[employer] makes good sun protective clothing lines    Cryotherapy    What is it?    Use of a very cold liquid, such as liquid nitrogen, to freeze and destroy abnormal skin cells that need to be removed    What should I expect?    Tenderness and redness    A small blister that might grow and fill with dark purple blood. There may be crusting.    More than one treatment may be needed if the lesions do not go away.    How do I care for the treated area?    Gently wash the area with your hands when bathing.    Use a thin layer of Vaseline to help with healing. You may use a Band-Aid.     The area should heal within 7-10 days and may leave behind a pink or lighter color.     Do not use an antibiotic or Neosporin ointment.     You may take acetaminophen (Tylenol) for pain.     Call your Doctor if you have:    Severe pain    Signs of infection (warmth, redness, cloudy yellow drainage, and or a bad smell)    Questions or concerns    Who should I call with questions?       [facility]: [phone]       [facility]: [phone]       For urgent needs outside of business hours call the [facility] at [phone]        and ask for the dermatology resident on call

## 2018-04-19 NOTE — PATIENT INSTRUCTIONS
1.  Start iron sulfate 325 mg once a day.  Let us know if have any trouble with constipation.  2.  Kidney function is stable, BPs in good range.  3.  Agree with desire to improve exercise and diet.

## 2018-04-19 NOTE — MR AVS SNAPSHOT
After Visit Summary   4/19/2018    Yung Grande    MRN: [id number]           Patient Information     Date Of Birth          1960        Visit Information        Provider Department      4/19/2018 3:00 PM Cecelia Angel PA-C [facility] Nephrology        Today's Diagnoses     Iron deficiency anemia, unspecified iron deficiency anemia type    -  1      Care Instructions    1.  Start iron sulfate 325 mg once a day.  Let us know if have any trouble with constipation.  2.  Kidney function is stable, BPs in good range.  3.  Agree with desire to improve exercise and diet.              Follow-ups after your visit        Follow-up notes from your care team     Return in about 6 months (around 10/19/2018).      Your next 10 appointments already scheduled     Oct 18, 2018 11:30 AM CDT   Lab with [facility]   [facility] Lab [facility])    [address] [place] [address]  1st Floor  [facility] [id number] [phone]            Oct 18, 2018 12:30 PM CDT   (Arrive by 12:00 PM)   Return Visit with Gena Michel MD   [facility] Nephrology ([facility])    [address] [place] [address]  [address]  [facility] [id number] [phone]            Jan 11, 2019 10:30 AM CST   (Arrive by 10:15 AM)   Return Liver Transplant with Stefany Rand MD   [facility] Hepatology ([facility])    [address] [place] [address]  [address]  [facility] [id number] [phone]              Future tests that were ordered for you today     Open Future Orders        Priority Expected Expires Ordered    Lipid Profile Routine 4/19/2018 4/19/2019 4/19/2018            Who to contact     If you have questions or need follow up information about today's clinic visit or your schedule please contact [facility] NEPHROLOGY directly at [phone].  Normal or non-critical lab and imaging results will be communicated to you by MyChart, letter or phone within 4 business days  "after the clinic has received the results. If you do not hear from us within 7 days, please contact the clinic through [employer] or phone. If you have a critical or abnormal lab result, we will notify you by phone as soon as possible.  Submit refill requests through [employer] or call your pharmacy and they will forward the refill request to us. Please allow 3 business days for your refill to be completed.          Additional Information About Your Visit        [employer] Information     [employer] gives you secure access to your electronic health record. If you see a primary care provider, you can also send messages to your care team and make appointments. If you have questions, please call your primary care clinic.  If you do not have a primary care provider, please call [phone] and they will assist you.        Care EveryWhere ID     This is your Care EveryWhere ID. This could be used by other organizations to access your [place] medical records  [id number]        Your Vitals Were     Pulse Height Pulse Oximetry BMI (Body Mass Index)          90 1.753 m (5' 9.02\") 99% 41.86 kg/m2         Blood Pressure from Last 3 Encounters:   04/19/18 (!) 129/92   01/19/18 (!) 138/94   09/13/17 122/85    Weight from Last 3 Encounters:   04/19/18 128.6 kg (283 lb 9.6 oz)   01/19/18 125.5 kg (276 lb 9.6 oz)   07/18/17 117.8 kg (259 lb 12.8 oz)              Today, you had the following     No orders found for display         Today's Medication Changes          These changes are accurate as of 4/19/18  4:19 PM.  If you have any questions, ask your nurse or doctor.               Start taking these medicines.        Dose/Directions    clindamycin 1 % lotion   Commonly known as:  CLEOCIN T   Used for:  Folliculitis   Started by:  eGoff Anderson MD        Apply topically 2 times daily To areas of bumps on back of scalp   Quantity:  60 mL   Refills:  1       ferrous sulfate 325 (65 Fe) MG tablet   Commonly known as:  IRON   Used for:  " Iron deficiency anemia, unspecified iron deficiency anemia type   Started by:  Cecelia Angel PA-C        Dose:  1 tablet   Take 1 tablet (325 mg) by mouth daily (with breakfast)   Quantity:  100 tablet   Refills:  3            Where to get your medicines      These medications were sent to [facility] [place], [place] - [address] [facility].  [address] [facility] [facility] [address]     Phone:  [phone]     clindamycin 1 % lotion         Some of these will need a paper prescription and others can be bought over the counter.  Ask your nurse if you have questions.     You don't need a prescription for these medications     ferrous sulfate 325 (65 Fe) MG tablet                Primary Care Provider Office Phone # Fax #    Anthony Weinstein [phone]       [facility] [address] [facility] [address]        Equal Access to Services     RAF PETERS : Jayme Jj, waaxda luqadaha, qaybta kaalmada marck, kahlil dominguez . So [facility] [phone].    ATENCIÓN: Si habla español, tiene a chavez disposición servicios gratuitos de asistencia lingüística. [facility] al [phone].    We comply with applicable federal civil rights laws and [place] laws. We do not discriminate on the basis of race, color, national origin, age, disability, sex, sexual orientation, or gender identity.            Thank you!     Thank you for choosing [facility] NEPHROLOGY  for your care. Our goal is always to provide you with excellent care. Hearing back from our patients is one way we can continue to improve our services. Please take a few minutes to complete the written survey that you may receive in the mail after your visit with us. Thank you!             Your Updated Medication List - Protect others around you: Learn how to safely use, store and throw away your medicines at www.disposemymeds.org.          This list is accurate as of 4/19/18   4:19 PM.  Always use your most recent med list.                   Brand Name Dispense Instructions for use Diagnosis    CENTRUM SILVER per tablet      Take 1 tablet by mouth daily        clindamycin 1 % lotion    CLEOCIN T    60 mL    Apply topically 2 times daily To areas of bumps on back of scalp    Folliculitis       EPIPEN 2-CHEPE 0.3 MG/0.3ML injection 2-pack   Generic drug:  EPINEPHrine      Inject 0.3 mg into the muscle    Status post liver transplantation (H)       ferrous sulfate 325 (65 Fe) MG tablet    IRON    100 tablet    Take 1 tablet (325 mg) by mouth daily (with breakfast)    Iron deficiency anemia, unspecified iron deficiency anemia type       LORazepam 0.5 MG tablet    ATIVAN    2 tablet    Take 1 tablet (0.5 mg) by mouth every 8 hours as needed for anxiety (Take one tab at home and repeat as needed.)    Status post liver transplantation (H)       MAPAP 325 MG tablet   Generic drug:  acetaminophen      Take 650 mg by mouth        mycophenolate 500 MG tablet    GENERIC EQUIVALENT     Take 360 mg by mouth 2 times daily    Status post liver transplantation (H)       mycophenolic acid 360 MG EC tablet     120 tablet    Take 2 tablets (720 mg) by mouth 2 times daily    Liver transplanted (H)       predniSONE 5 MG tablet    DELTASONE    30 tablet    Take 1 tablet (5 mg) by mouth daily    Liver transplanted (H)       sildenafil 20 MG tablet    REVATIO     Take 20 mg by mouth as needed (prior to sexual activity)        VITAMIN C PO      Take 500 mg by mouth daily

## 2018-04-19 NOTE — LETTER
4/19/2018       RE: Yung Grande  [address] [facility] [place] [address]     Dear Colleague,    Thank you for referring your patient, Yung Grande, to the [facility] DERMATOLOGY at [facility] Please see a copy of my visit note below.    CHIEF COMPLAINT:  Transplant skin check.      SUBJECTIVE:  Kareem is a very pleasant 57-year-old male who is status post liver transplant for alcoholic hepatitis in 04/2016.  He continues on mycophenolate mofetil and prednisone for antirejection prophylaxis.  He presents today for a full body skin exam post-transplant.  He reports an irritated area on his central chest which occasionally catches on his clothes and feels sore when touched, but otherwise denies any new or changing moles and no other localized areas of itch, pain or bleeding.  He does note that he gets occasional acne-like bumps along his posterior hairline ever since his transplant, especially when he was on higher doses of prednisone.      REVIEW OF SYSTEMS:  No recent fevers or chills.  No nonhealing oral sores.      PHYSICAL EXAMINATION:   GENERAL:  General, this is a well-appearing, well-nourished male with a normal mood and affect who is oriented x3.   SKIN:  Cutaneous exam of the head, neck, chest, abdomen, back, bilateral upper and lower extremities and buttocks was performed.  On the upper central chest, there is a keratotic, somewhat verrucous appearing 4 mm pink papule which is predominantly exophytic.  Along the posterior scalp line, there are scattered 2-3 mm pink acneiform papules.  On the right buttock there is an oblong 3 x 1 mm brown macule with regular features on dermoscopy; otherwise he has essentially no pigmented lesions on full body skin exam.      ASSESSMENT AND PLAN:   1.  Inflamed verrucous keratosis of the central chest.  After informed verbal consent, this was treated with liquid nitrogen x10 seconds x2 cycles.  The patient tolerated this without  complication.   2.  Mild folliculitis of the posterior hairline, likely owing to immunosuppressive medications.  We gave him a prescription for clindamycin 1% lotion to be applied once to twice daily.   3.  Otherwise benign-appearing skin check in a liver transplant patient.  We had a long discussion today about the increased risk of nonmelanoma skin cancers in transplant patients, specifically the elevated risk of squamous cell carcinoma.  We discussed the importance of sun protective behaviors, sun protective clothing and high SPF sunscreens used liberally.  We also provided him with information regarding what to look for on self skin exams.  I encouraged him to follow up once a year from now on after his transplant for full body skin exams.  As such, he will follow up in our clinic in 1 year's time.       Geoff Anderson MD  Dermatology Attending

## 2018-04-19 NOTE — PROGRESS NOTES
CHIEF COMPLAINT:  Transplant skin check.      SUBJECTIVE:  Kareem is a very pleasant 57-year-old male who is status post liver transplant for alcoholic hepatitis in 04/2016.  He continues on mycophenolate mofetil and prednisone for antirejection prophylaxis.  He presents today for a full body skin exam post-transplant.  He reports an irritated area on his central chest which occasionally catches on his clothes and feels sore when touched, but otherwise denies any new or changing moles and no other localized areas of itch, pain or bleeding.  He does note that he gets occasional acne-like bumps along his posterior hairline ever since his transplant, especially when he was on higher doses of prednisone.      REVIEW OF SYSTEMS:  No recent fevers or chills.  No nonhealing oral sores.      PHYSICAL EXAMINATION:   GENERAL:  General, this is a well-appearing, well-nourished male with a normal mood and affect who is oriented x3.   SKIN:  Cutaneous exam of the head, neck, chest, abdomen, back, bilateral upper and lower extremities and buttocks was performed.  On the upper central chest, there is a keratotic, somewhat verrucous appearing 4 mm pink papule which is predominantly exophytic.  Along the posterior scalp line, there are scattered 2-3 mm pink acneiform papules.  On the right buttock there is an oblong 3 x 1 mm brown macule with regular features on dermoscopy; otherwise he has essentially no pigmented lesions on full body skin exam.      ASSESSMENT AND PLAN:   1.  Inflamed verrucous keratosis of the central chest.  After informed verbal consent, this was treated with liquid nitrogen x10 seconds x2 cycles.  The patient tolerated this without complication.   2.  Mild folliculitis of the posterior hairline, likely owing to immunosuppressive medications.  We gave him a prescription for clindamycin 1% lotion to be applied once to twice daily.   3.  Otherwise benign-appearing skin check in a liver transplant patient.  We had a  long discussion today about the increased risk of nonmelanoma skin cancers in transplant patients, specifically the elevated risk of squamous cell carcinoma.  We discussed the importance of sun protective behaviors, sun protective clothing and high SPF sunscreens used liberally.  We also provided him with information regarding what to look for on self skin exams.  I encouraged him to follow up once a year from now on after his transplant for full body skin exams.  As such, he will follow up in our clinic in 1 year's time.       Geoff Anderson MD  Dermatology Attending

## 2018-04-19 NOTE — PROGRESS NOTES
"Nephrology Follow-up  4/19/2018  Primary Nephrologist Dr. Michel    Assessment & Recommendations:   57 year old male status post liver transplant on 4/5/16 with ANA requiring dialysis post transplant, recovery to CKD stage III. 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      1. 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  Mild proteinuria (currently too low to quantify), no hematuria.  -educated on risk factors for progression including obesity.  He is wanting to get into a program or begin lifestyle changes to address this but has not initiated as of yet.  -electrolytes within normal range.    2. HTN/Volume: Euvolemic. BPs at goal  -no medication.    3. Anemia - Hb now at 12, mild iron deficiency.  -will start Fe sulfate 325 mg once daily.  Advised of risk of constipation SE.    4. Bone and Mineral Disorder - PTH and vitamin D are pending, corrected calcium and phosphorus are within normal range.    Will develop plan based on results.    5. OTLtx - mgmt per Hepatology, on MMF and prednisone. .     RTC in 6 months to see Dr. Michel.  No changes made in care plan today.     Cecelia Angel PA-C  [facility]  [facility]  Department of Medicine  Division of Renal Disease and Hypertension  [phone]    Interval History:   Mr Grande denies any new complaints, he feels well and is grateful for his health after being so ill at time of liver transplant.  He says he has been gaining weight and is concerned about that. He reports he is fairly active, works on an \"estate\" and has to walk significant distances in between. He states will work on trying to implement dietary changes though.  He denies any LE edema, shortness of breath, nausea, vomiting, abdominal pain, dizziness, lightheadedness. His tacrolimus has been discontinued since 4/2017.     Review of Systems:   Review of systems was negative except as " "noted above.    Physical Exam:   BP (!) 129/92  Pulse 90  Ht 1.753 m (5' 9.02\")  Wt 128.6 kg (283 lb 9.6 oz)  SpO2 99%  BMI 41.86 kg/m2     Exam:  Constitutional:  alert and no distress  Head: Normocephalic.   ENT: ENT exam normal, no neck nodes or sinus tenderness  Cardiovascular: negative, PMI normal. No lifts, heaves, or thrills. RRR. No murmurs, clicks gallops or rub  Respiratory: negative, Percussion normal. Good diaphragmatic excursion. Lungs clear  Gastrointestinal: Abdomen soft, non-tender. BS normal. No masses, organomegaly  : Deferred  Musculoskeletal: extremities normal- no gross deformities noted, gait normal and normal muscle tone. Trace LE edema   Skin: no suspicious lesions or rashes  Psychiatric: mentation appears normal and affect normal/bright    Labs:   Last Basic Metabolic Panel:  Lab Results   Component Value Date     09/13/2017      Lab Results   Component Value Date    POTASSIUM 4.6 09/13/2017     Lab Results   Component Value Date    CHLORIDE 104 09/13/2017     Lab Results   Component Value Date    CLAUEDTTE 9.1 09/13/2017     Lab Results   Component Value Date    CO2 27 09/13/2017     Lab Results   Component Value Date    BUN 16 09/13/2017     Lab Results   Component Value Date    CR 1.51 09/13/2017     Lab Results   Component Value Date    GLC 94 09/13/2017     Current Medications:  Current Outpatient Prescriptions   Medication     acetaminophen (MAPAP) 325 MG tablet     Ascorbic Acid (VITAMIN C PO)     clindamycin (CLEOCIN T) 1 % lotion     EPINEPHrine (EPIPEN 2-CHEPE) 0.3 MG/0.3ML injection 2-pack     ferrous sulfate (IRON) 325 (65 Fe) MG tablet     Multiple Vitamins-Minerals (CENTRUM SILVER) per tablet     mycophenolate (CELLCEPT - GENERIC EQUIVALENT) 500 MG tablet     MYFORTIC (BRAND) 360 MG EC TABLET     predniSONE (DELTASONE) 5 MG tablet     sildenafil (REVATIO/VIAGRA) 20 MG tablet     LORazepam (ATIVAN) 0.5 MG tablet     No current facility-administered medications for this " visit.       Cecelia Angel PA-C

## 2018-04-19 NOTE — NURSING NOTE
Dermatology Rooming Note    Yung JOSELIN Harjinder's goals for this visit include:   Chief Complaint   Patient presents with     Derm Problem     Yung is here for a transplant skin check. He has an area of concern on his chest.      Sarah Nj LPN

## 2018-04-19 NOTE — NURSING NOTE
"Chief Complaint   Patient presents with     RECHECK     CKD        Initial BP (!) 129/92  Pulse 90  Ht 1.753 m (5' 9.02\")  Wt 128.6 kg (283 lb 9.6 oz)  SpO2 99%  BMI 41.86 kg/m2 Estimated body mass index is 41.86 kg/(m^2) as calculated from the following:    Height as of this encounter: 1.753 m (5' 9.02\").    Weight as of this encounter: 128.6 kg (283 lb 9.6 oz).  Medication Reconciliation: complete   Leatha Emerson, LAURIE      "

## 2018-04-20 LAB — DEPRECATED CALCIDIOL+CALCIFEROL SERPL-MC: 39 UG/L (ref 20–75)

## 2018-04-21 LAB — ETHYL GLUCURONIDE UR QL: NEGATIVE

## 2018-04-25 PROBLEM — L82.0 INFLAMED SEBORRHEIC KERATOSIS: Status: ACTIVE | Noted: 2018-04-25

## 2018-04-25 PROBLEM — L73.9 FOLLICULITIS: Status: ACTIVE | Noted: 2018-04-25

## 2018-04-30 ENCOUNTER — TELEPHONE (OUTPATIENT)
Dept: PHARMACY | Facility: CLINIC | Age: 58
End: 2018-04-30

## 2018-09-20 ENCOUNTER — TELEPHONE (OUTPATIENT)
Dept: NEPHROLOGY | Facility: CLINIC | Age: 58
End: 2018-09-20

## 2018-09-20 DIAGNOSIS — N18.30 CKD (CHRONIC KIDNEY DISEASE) STAGE 3, GFR 30-59 ML/MIN (H): Primary | ICD-10-CM

## 2018-09-20 NOTE — TELEPHONE ENCOUNTER
[facility] Call Center    Phone Message    May a detailed message be left on voicemail: yes    Reason for Call: Order(s): Other:   Reason for requested: Labs  Date needed: 11/7/18  Provider name: Lucas      Action Taken: Message routed to:  [facility] ([facility]): please enter lab orders for appt. on 11/7/18

## 2018-11-05 DIAGNOSIS — N18.30 CKD (CHRONIC KIDNEY DISEASE) STAGE 3, GFR 30-59 ML/MIN (H): Primary | ICD-10-CM

## 2018-11-07 ENCOUNTER — OFFICE VISIT (OUTPATIENT)
Dept: NEPHROLOGY | Facility: CLINIC | Age: 58
End: 2018-11-07
Attending: COLON & RECTAL SURGERY
Payer: COMMERCIAL

## 2018-11-07 VITALS
WEIGHT: 281.4 LBS | BODY MASS INDEX: 40.29 KG/M2 | DIASTOLIC BLOOD PRESSURE: 92 MMHG | SYSTOLIC BLOOD PRESSURE: 139 MMHG | OXYGEN SATURATION: 98 % | TEMPERATURE: 97.9 F | HEIGHT: 70 IN | HEART RATE: 69 BPM

## 2018-11-07 DIAGNOSIS — Z94.4 LIVER REPLACED BY TRANSPLANT (H): ICD-10-CM

## 2018-11-07 DIAGNOSIS — N18.30 CKD (CHRONIC KIDNEY DISEASE) STAGE 3, GFR 30-59 ML/MIN (H): Primary | ICD-10-CM

## 2018-11-07 DIAGNOSIS — N18.30 CKD (CHRONIC KIDNEY DISEASE) STAGE 3, GFR 30-59 ML/MIN (H): ICD-10-CM

## 2018-11-07 LAB
ALBUMIN SERPL-MCNC: 4.1 G/DL (ref 3.4–5)
ALBUMIN UR-MCNC: NEGATIVE MG/DL
ANION GAP SERPL CALCULATED.3IONS-SCNC: 7 MMOL/L (ref 3–14)
APPEARANCE UR: CLEAR
BACTERIA #/AREA URNS HPF: ABNORMAL /HPF
BILIRUB UR QL STRIP: NEGATIVE
BUN SERPL-MCNC: 18 MG/DL (ref 7–30)
CALCIUM SERPL-MCNC: 8.8 MG/DL (ref 8.5–10.1)
CHLORIDE SERPL-SCNC: 106 MMOL/L (ref 94–109)
CO2 SERPL-SCNC: 27 MMOL/L (ref 20–32)
COLOR UR AUTO: ABNORMAL
CREAT SERPL-MCNC: 1.45 MG/DL (ref 0.66–1.25)
CREAT UR-MCNC: 17 MG/DL
ERYTHROCYTE [DISTWIDTH] IN BLOOD BY AUTOMATED COUNT: 13.6 % (ref 10–15)
GFR SERPL CREATININE-BSD FRML MDRD: 50 ML/MIN/1.7M2
GLUCOSE SERPL-MCNC: 103 MG/DL (ref 70–99)
GLUCOSE UR STRIP-MCNC: NEGATIVE MG/DL
HCT VFR BLD AUTO: 43 % (ref 40–53)
HGB BLD-MCNC: 13.7 G/DL (ref 13.3–17.7)
HGB UR QL STRIP: NEGATIVE
KETONES UR STRIP-MCNC: NEGATIVE MG/DL
LEUKOCYTE ESTERASE UR QL STRIP: NEGATIVE
MCH RBC QN AUTO: 29.3 PG (ref 26.5–33)
MCHC RBC AUTO-ENTMCNC: 31.9 G/DL (ref 31.5–36.5)
MCV RBC AUTO: 92 FL (ref 78–100)
NITRATE UR QL: NEGATIVE
PH UR STRIP: 7 PH (ref 5–7)
PHOSPHATE SERPL-MCNC: 3.1 MG/DL (ref 2.5–4.5)
PLATELET # BLD AUTO: 177 10E9/L (ref 150–450)
POTASSIUM SERPL-SCNC: 4.3 MMOL/L (ref 3.4–5.3)
PROT UR-MCNC: <0.05 G/L
PROT/CREAT 24H UR: NORMAL G/G CR (ref 0–0.2)
RBC # BLD AUTO: 4.67 10E12/L (ref 4.4–5.9)
RBC #/AREA URNS AUTO: 0 /HPF (ref 0–2)
SODIUM SERPL-SCNC: 139 MMOL/L (ref 133–144)
SOURCE: ABNORMAL
SP GR UR STRIP: 1 (ref 1–1.03)
UROBILINOGEN UR STRIP-MCNC: 0 MG/DL (ref 0–2)
WBC # BLD AUTO: 6.3 10E9/L (ref 4–11)
WBC #/AREA URNS AUTO: 1 /HPF (ref 0–5)

## 2018-11-07 PROCEDURE — 80069 RENAL FUNCTION PANEL: CPT | Performed by: INTERNAL MEDICINE

## 2018-11-07 PROCEDURE — G0463 HOSPITAL OUTPT CLINIC VISIT: HCPCS | Mod: ZF

## 2018-11-07 PROCEDURE — 36415 COLL VENOUS BLD VENIPUNCTURE: CPT | Performed by: INTERNAL MEDICINE

## 2018-11-07 PROCEDURE — 81001 URINALYSIS AUTO W/SCOPE: CPT | Performed by: INTERNAL MEDICINE

## 2018-11-07 PROCEDURE — 85027 COMPLETE CBC AUTOMATED: CPT | Performed by: INTERNAL MEDICINE

## 2018-11-07 PROCEDURE — 84156 ASSAY OF PROTEIN URINE: CPT | Performed by: INTERNAL MEDICINE

## 2018-11-07 RX ORDER — MULTIVIT WITH MINERALS/LUTEIN
1000 TABLET ORAL
COMMUNITY

## 2018-11-07 RX ORDER — EPINEPHRINE 0.3 MG/.3ML
INJECTION SUBCUTANEOUS
COMMUNITY
Start: 2018-06-25 | End: 2019-01-11

## 2018-11-07 RX ORDER — MYCOPHENOLIC ACID 360 MG/1
TABLET, DELAYED RELEASE ORAL
COMMUNITY
Start: 2018-11-05 | End: 2018-11-07

## 2018-11-07 ASSESSMENT — PAIN SCALES - GENERAL: PAINLEVEL: NO PAIN (0)

## 2018-11-07 NOTE — PROGRESS NOTES
Nephrology Clinic Progress Note    Assessment & Recommendations:   56 year old male status post liver transplant on 4/5/16 with ANA requiring dialysis, discharged from the hospital off dialysis with initial recovery. Etiology of injury is felt ot be related to HRS and high CNI levels. For the past two years, he has been doing well.    1. CKD 3- Patient had underlying dialysis-requiring ANA after liver Tx with recovery - etiology felt to be HRS + CNI related. Creatinine has been ~ 1.5 since that time. Today, his Crt is 1.4 and stable. This is likely his baseline with eGFR of ~ 50.  His electrolytes are normal, and he is euvolemic on exam. Unclear what is trajectory of disease will be, but appears he has settled at this creatinine (of note, no previous known kidney disease prior to liver disease)  2. HTN/Volume: Home BP monitoring is acceptable. No edema/SOB/Orthopnea to suggest volume overload.  No proteinuria and with normal BP, no plans for ACEi at this time.  3. Anemia - Hb normal and has been in 12-13 over the past year.  4. Bone and Mineral Disorder - Vit D level, Phos and Calcium are normal. PTH with mild elevation at 106 in April. Takes multivitamin that does have some Vit D.  5. OTLtx - 4/2016; per Hepatology. He is currently on Prednisone and MMF. LFTs appear normal.  6.  Obesity- pt has gained 100 pounds since transplant    PLAN  1. Continue weight loss program  2. Home BP monitoring    RTC in 1 year with UA, Urine Protein Creatinine (no bloodwork) with Dr. Michel.     Pt seen and discussed with Dr. Richardson.    Reagan Zavala MD   [id number]    Interval History : Last seen in Neph Clinic 9/2017. Since that time, Mr. Grande denies any new complaints. He states he is doing quite well. He is working on losing weight, as he has gained 5 pounds since earlier this year.  Otherwise, his liver has been functioning well. He is working on becoming more active. He denies any LE edema, shortness of breath. He is taking  "PO well with good appetite, no nausea and vomiting. Denies any abdominal pain, hematuria, dysuria.     He has been checking his BP at home daily - in the morning, 115-29's/80's. That has been consistent for the past several months.     Review of Systems:   A 14 point review of systems was negative except as noted above.    Physical Exam:   BP (!) 139/92  Pulse 69  Temp 97.9  F (36.6  C)  Ht 1.765 m (5' 9.5\")  Wt 127.6 kg (281 lb 6.4 oz)  SpO2 98%  BMI 40.96 kg/m2  Exam:  Constitutional: healthy, alert and no distress  HEENT: Sclerae anicteric, EOMI  Cardiovascular: RRR. No murmurs  Respiratory: Air entry bilaterally, no crackles  Gastrointestinal: Abdomen soft, non-tender. BS normal.   Musculoskeletal: extremities normal- no gross deformities noted, no LE edema   Skin: no suspicious lesions or rashes  Psychiatric: normal mood and congruent affect  Neuro: AOx3, no focal deficits    Labs:     Results for HADLEY GALLARDO (MRN [id number]) as of 11/7/2018 15:08   11/7/2018 13:57   Sodium 139   Potassium 4.3   Chloride 106   Carbon Dioxide 27   Urea Nitrogen 18   Creatinine 1.45 (H)   GFR Estimate 50 (L)   GFR Estimate If Black 60 (L)   Calcium 8.8   Anion Gap 7   Phosphorus 3.1   Albumin 4.1       Current Medications:  Current Outpatient Prescriptions   Medication     ascorbic acid (VITAMIN C) 1000 MG TABS     clindamycin (CLEOCIN T) 1 % lotion     diphenhydrAMINE-acetaminophen (TYLENOL PM)  MG tablet     EPINEPHrine (EPIPEN 2-CHEPE) 0.3 MG/0.3ML injection 2-pack     ferrous sulfate (IRON) 325 (65 Fe) MG tablet     Multiple Vitamins-Minerals (CENTRUM SILVER) per tablet     predniSONE (DELTASONE) 5 MG tablet     sildenafil (REVATIO/VIAGRA) 20 MG tablet     acetaminophen (MAPAP) 325 MG tablet     Ascorbic Acid (VITAMIN C PO)     MYFORTIC (BRAND) 360 MG EC TABLET          Reagan Zavala MD       I have seen and examined this patient with the resident.  This note reflects our joint assessment and plan. "     Tasha Richardson MD

## 2018-11-07 NOTE — NURSING NOTE
"Chief Complaint   Patient presents with     Follow Up For     6 months     Blood pressure (!) 139/92, pulse 69, temperature 97.9  F (36.6  C), height 1.765 m (5' 9.5\"), weight 127.6 kg (281 lb 6.4 oz), SpO2 98 %.    Marah Chatterjee LPN    "

## 2018-11-07 NOTE — MR AVS SNAPSHOT
After Visit Summary   11/7/2018    Yung Grande    MRN: [id number]           Patient Information     Date Of Birth          1960        Visit Information        Provider Department      11/7/2018 3:00 PM Reagan Zavala MD [facility] Nephrology        Today's Diagnoses     CKD (chronic kidney disease) stage 3, GFR 30-59 ml/min (H)    -  1    Liver replaced by transplant (H)           Follow-ups after your visit        Follow-up notes from your care team     Return in about 1 year (around 11/7/2019) for Routine Visit, Lab Work.      Your next 10 appointments already scheduled     Jan 11, 2019 10:30 AM CST   (Arrive by 10:15 AM)   Return Liver Transplant with Stefany Rand MD   [facility] Hepatology ([facility])    [address] [place] [address]  [address]  [facility] [address] [phone]              Who to contact     If you have questions or need follow up information about today's clinic visit or your schedule please contact [facility] NEPHROLOGY directly at [phone].  Normal or non-critical lab and imaging results will be communicated to you by MyChart, letter or phone within 4 business days after the clinic has received the results. If you do not hear from us within 7 days, please contact the clinic through [facility] or phone. If you have a critical or abnormal lab result, we will notify you by phone as soon as possible.  Submit refill requests through [facility] or call your pharmacy and they will forward the refill request to us. Please allow 3 business days for your refill to be completed.          Additional Information About Your Visit        [facility] Information     [facility] gives you secure access to your electronic health record. If you see a primary care provider, you can also send messages to your care team and make appointments. If you have questions, please call your primary care clinic.  If you do not have a primary care provider, please call  "[phone] and they will assist you.        Care EveryWhere ID     This is your Care EveryWhere ID. This could be used by other organizations to access your [place] medical records  [id number]        Your Vitals Were     Pulse Temperature Height Pulse Oximetry BMI (Body Mass Index)       69 97.9  F (36.6  C) 1.765 m (5' 9.5\") 98% 40.96 kg/m2        Blood Pressure from Last 3 Encounters:   11/07/18 (!) 139/92   04/19/18 (!) 129/92   01/19/18 (!) 138/94    Weight from Last 3 Encounters:   11/07/18 127.6 kg (281 lb 6.4 oz)   04/19/18 128.6 kg (283 lb 9.6 oz)   01/19/18 125.5 kg (276 lb 9.6 oz)              Today, you had the following     No orders found for display         Today's Medication Changes          These changes are accurate as of 11/7/18  3:57 PM.  If you have any questions, ask your nurse or doctor.               Stop taking these medicines if you haven't already. Please contact your care team if you have questions.     ferrous sulfate 325 (65 Fe) MG tablet   Commonly known as:  IRON   Stopped by:  Reagan Zavala MD                    Primary Care Provider Office Phone # Fax #    Anthony Weinstein [phone]       [facility] [address]  [facility] [address]        Equal Access to Services     [facility]: Hadii michelle tripp Solynda, waaxda luqadaha, qaybta kaalmada marck, kahlil soares. So [facility] [phone].    ATENCIÓN: Si habla español, tiene a chavez disposición servicios gratuitos de asistencia lingüística. Llame al [phone].    We comply with applicable federal civil rights laws and [place] laws. We do not discriminate on the basis of race, color, national origin, age, disability, sex, sexual orientation, or gender identity.            Thank you!     Thank you for choosing [facility] NEPHROLOGY  for your care. Our goal is always to provide you with excellent care. Hearing back from our patients is one way we can continue " to improve our services. Please take a few minutes to complete the written survey that you may receive in the mail after your visit with us. Thank you!             Your Updated Medication List - Protect others around you: Learn how to safely use, store and throw away your medicines at www.disposemymeds.org.          This list is accurate as of 11/7/18  3:57 PM.  Always use your most recent med list.                   Brand Name Dispense Instructions for use Diagnosis    ascorbic acid 1000 MG Tabs    vitamin C     Take 1,000 mg by mouth        CENTRUM SILVER per tablet      Take 1 tablet by mouth daily        clindamycin 1 % lotion    CLEOCIN T    60 mL    Apply topically 2 times daily To areas of bumps on back of scalp    Folliculitis       diphenhydrAMINE-acetaminophen  MG tablet    TYLENOL PM          * EPIPEN 2-CHEPE 0.3 MG/0.3ML injection 2-pack   Generic drug:  EPINEPHrine      Inject 0.3 mg into the muscle    Status post liver transplantation (H)       * EPIPEN 2-CHEPE 0.3 MG/0.3ML injection 2-pack   Generic drug:  EPINEPHrine      INJECT 0.3 ML INTRAMUSCULARLY ONCE AS NEEDED FOR UP TO 1 DOSE.        MAPAP 325 MG tablet   Generic drug:  acetaminophen      Take 650 mg by mouth        mycophenolic acid 360 MG EC tablet     120 tablet    Take 2 tablets (720 mg) by mouth 2 times daily    Liver transplanted (H)       predniSONE 5 MG tablet    DELTASONE    30 tablet    Take 1 tablet (5 mg) by mouth daily    Liver transplanted (H)       sildenafil 20 MG tablet    REVATIO     Take 20 mg by mouth as needed (prior to sexual activity)        VITAMIN C PO      Take 500 mg by mouth daily        * Notice:  This list has 2 medication(s) that are the same as other medications prescribed for you. Read the directions carefully, and ask your doctor or other care provider to review them with you.

## 2018-12-06 DIAGNOSIS — Z94.4 LIVER TRANSPLANTED (H): ICD-10-CM

## 2018-12-06 RX ORDER — PREDNISONE 5 MG/1
TABLET ORAL
Qty: 30 TABLET | Refills: 11 | Status: SHIPPED | OUTPATIENT
Start: 2018-12-06 | End: 2019-10-02

## 2019-01-02 DIAGNOSIS — Z94.4 LIVER TRANSPLANTED (H): ICD-10-CM

## 2019-01-03 RX ORDER — MYCOPHENOLIC ACID 360 MG/1
720 TABLET, DELAYED RELEASE ORAL 2 TIMES DAILY
Qty: 120 TABLET | Refills: 11 | Status: SHIPPED | OUTPATIENT
Start: 2019-01-03 | End: 2019-10-02

## 2019-01-09 NOTE — PROGRESS NOTES
A/P    58 year old male s/p LT for ETOH 4/2016. On MMF and prednisone. Medically doing extremely well.    CKD Kidney function stable, creat around 1.5 and follows with Dr. Michel. Tolerating MMF and pred well. Excellent liver function.     Obesity Discussed weight and exercise again. He was obese prior to getting sick with liver disease.      Past NATALIA Stopped iron as his iron levels are sufficient, hgb normal    Skin cancer screening Went 4/2018. Discussed risk of skin cancer. Derm yearly.     Bone health Due for DEXA. Ordered  Colon polyps Due for colonoscopy. Ordered.  Proph/HCM Shingles and pneumovax booster this year.    Hypercholesterolemia ok for statin or whatever is recommended by [facility]    Cough with viral infections Rx for prn Tessalon pearls given which have helped in the past.      RTC 1 year.     This was a 30 minute visit, over 50% counseling and coordination of care.   ==============================================  S: 58 year old male s/p DDLT 4/5/16 for ETOH.  He recently saw his PCP. He said he had some crackles. CXR was normal. He currently has a cold. No fever. Non productive cough.    He has to have multiple teeth pulled. He has an infected tooth.     His PCP said his cholesterol needs treatment. He wants to make sure a statin is ok.     EXPLANT: Steatosis, no HCC, PV thrombus.  IS: MMF and prednisone. Taken off tac for kidney dysfunction. Had mouth sores with everolimus.  LABS: Up to date, liver tests normal 1/7/19  REJECTION: None.  BILIARY ISSUES: None  STENT: Out on 7/25/16  KIDNEY FUNCTION:   Creatinine   Date Value Ref Range Status   11/07/2018 1.45 (H) 0.66 - 1.25 mg/dL Final   creat 1/7/19 1.54  BP: Good. No meds. At home he has 110-130/60-70s  PREV: UTD on screening. Colonoscopy 2015 3 polyps. Repeat 3-5 years.   Derm 4/16/18       Flu shot done this year  Dexa  2014 normal  DISEASE RECURRENCE: No alcohol for several years.  OTHER ISSUES: Had a spot in transplanted liver on CT. Follow up  "MRI showed hemangioma  Still struggling with weight. Was about 250-275 pre-transplant.      SOC: , here with wife lexy. Back to work full time as a . His sister  from liver disease (probably LASSITER) and multifocal HCC in 2017.     ROS: 10 point ROS neg other than the symptoms noted above in the HPI. Had a sensation in his abdomen after LT that was there for months, now gone.      O  /89   Pulse 94   Temp 99  F (37.2  C) (Oral)   Ht 1.753 m (5' 9\")   Wt 126.7 kg (279 lb 6.4 oz)   BMI 41.26 kg/m      GENERAL:  Very pleasant, well-appearing, in no acute distress.    HEENT:  No icterus, no oral lesions.    LYMPH:  No supraclavicular or cervical lymphadenopathy.    CARDIOVASCULAR:  Regular rate and rhythm.    CHEST:  Lungs are clear.    ABDOMEN:  Bowel sounds are present.  Abdomen is soft, nontender, nondistended.  Scar is well healed.      EXTREMITIES:  No edema.    SKIN:  No rash.    NEUROLOGIC:  Speech is fluent and clear.  No asterixis or tremor.     Current Outpatient Medications   Medication     acetaminophen (MAPAP) 325 MG tablet     ascorbic acid (VITAMIN C) 1000 MG TABS     clindamycin (CLEOCIN T) 1 % lotion     diphenhydrAMINE-acetaminophen (TYLENOL PM)  MG tablet     EPINEPHrine (EPIPEN 2-CHEPE) 0.3 MG/0.3ML injection 2-pack     Multiple Vitamins-Minerals (CENTRUM SILVER) per tablet     mycophenolic acid (GENERIC EQUIVALENT) 360 MG EC tablet     predniSONE (DELTASONE) 5 MG tablet     sildenafil (REVATIO/VIAGRA) 20 MG tablet     No current facility-administered medications for this visit.            "

## 2019-01-11 ENCOUNTER — TELEPHONE (OUTPATIENT)
Dept: TRANSPLANT | Facility: CLINIC | Age: 59
End: 2019-01-11

## 2019-01-11 ENCOUNTER — OFFICE VISIT (OUTPATIENT)
Dept: GASTROENTEROLOGY | Facility: CLINIC | Age: 59
End: 2019-01-11
Attending: INTERNAL MEDICINE
Payer: COMMERCIAL

## 2019-01-11 VITALS
BODY MASS INDEX: 41.38 KG/M2 | SYSTOLIC BLOOD PRESSURE: 151 MMHG | TEMPERATURE: 99 F | WEIGHT: 279.4 LBS | HEIGHT: 69 IN | DIASTOLIC BLOOD PRESSURE: 89 MMHG | HEART RATE: 94 BPM

## 2019-01-11 DIAGNOSIS — Z79.52 ON PREDNISONE THERAPY: ICD-10-CM

## 2019-01-11 DIAGNOSIS — Z23 ENCOUNTER FOR VACCINATION: Primary | ICD-10-CM

## 2019-01-11 DIAGNOSIS — Z86.0100 HISTORY OF COLONIC POLYPS: ICD-10-CM

## 2019-01-11 DIAGNOSIS — Z94.4 LIVER TRANSPLANTED (H): Primary | ICD-10-CM

## 2019-01-11 DIAGNOSIS — R05.9 COUGH: ICD-10-CM

## 2019-01-11 PROCEDURE — 90732 PPSV23 VACC 2 YRS+ SUBQ/IM: CPT | Mod: ZF | Performed by: INTERNAL MEDICINE

## 2019-01-11 PROCEDURE — G0463 HOSPITAL OUTPT CLINIC VISIT: HCPCS | Mod: 25,ZF

## 2019-01-11 PROCEDURE — 25000581 ZZH RX MED A9270 GY (STAT IND- M) 250: Mod: ZF | Performed by: INTERNAL MEDICINE

## 2019-01-11 PROCEDURE — 90750 HZV VACC RECOMBINANT IM: CPT | Mod: ZF | Performed by: INTERNAL MEDICINE

## 2019-01-11 PROCEDURE — 90472 IMMUNIZATION ADMIN EACH ADD: CPT | Mod: ZF

## 2019-01-11 PROCEDURE — 25000128 H RX IP 250 OP 636: Mod: ZF | Performed by: INTERNAL MEDICINE

## 2019-01-11 PROCEDURE — G0009 ADMIN PNEUMOCOCCAL VACCINE: HCPCS | Mod: ZF

## 2019-01-11 RX ORDER — BENZONATATE 100 MG/1
100 CAPSULE ORAL 3 TIMES DAILY PRN
Qty: 20 CAPSULE | Refills: 0 | Status: SHIPPED | OUTPATIENT
Start: 2019-01-11 | End: 2019-01-18

## 2019-01-11 RX ORDER — AMOXICILLIN 500 MG/1
2000 CAPSULE ORAL 2 TIMES DAILY
Qty: 12 CAPSULE | Refills: 0 | Status: SHIPPED | OUTPATIENT
Start: 2019-01-11 | End: 2019-01-14

## 2019-01-11 RX ADMIN — PNEUMOCOCCAL VACCINE POLYVALENT 0.5 ML
25; 25; 25; 25; 25; 25; 25; 25; 25; 25; 25; 25; 25; 25; 25; 25; 25; 25; 25; 25; 25; 25; 25 INJECTION, SOLUTION INTRAMUSCULAR; SUBCUTANEOUS at 12:58

## 2019-01-11 RX ADMIN — ZOSTER VACCINE RECOMBINANT, ADJUVANTED 0.5 ML: KIT at 13:00

## 2019-01-11 ASSESSMENT — MIFFLIN-ST. JEOR: SCORE: 2077.73

## 2019-01-11 ASSESSMENT — PAIN SCALES - GENERAL: PAINLEVEL: NO PAIN (0)

## 2019-01-11 NOTE — NURSING NOTE
"/89   Pulse 94   Temp 99  F (37.2  C) (Oral)   Ht 1.753 m (5' 9\")   Wt 126.7 kg (279 lb 6.4 oz)   BMI 41.26 kg/m    Chief Complaint   Patient presents with     RECHECK     follow up with liver transplant, tzimmer cma       "

## 2019-01-11 NOTE — LETTER
1/11/2019      RE: Yung Grande  [address] [facility] [place] [address]       A/P    58 year old male s/p LT for ETOH 4/2016. On MMF and prednisone. Medically doing extremely well.    CKD Kidney function stable, creat around 1.5 and follows with Dr. Michel. Tolerating MMF and pred well. Excellent liver function.     Obesity Discussed weight and exercise again. He was obese prior to getting sick with liver disease.      Past NATALIA Stopped iron as his iron levels are sufficient, hgb normal    Skin cancer screening Went 4/2018. Discussed risk of skin cancer. Derm yearly.     Bone health Due for DEXA. Ordered  Colon polyps Due for colonoscopy. Ordered.  Proph/HCM Shingles and pneumovax booster this year.    Hypercholesterolemia ok for statin or whatever is recommended by [facility]    Cough with viral infections Rx for prn Tessalon pearls given which have helped in the past.      RTC 1 year.     This was a 30 minute visit, over 50% counseling and coordination of care.   ==============================================  S: 58 year old male s/p DDLT 4/5/16 for ETOH.  He recently saw his PCP. He said he had some crackles. CXR was normal. He currently has a cold. No fever. Non productive cough.    He has to have multiple teeth pulled. He has an infected tooth.     His PCP said his cholesterol needs treatment. He wants to make sure a statin is ok.     EXPLANT: Steatosis, no HCC, PV thrombus.  IS: MMF and prednisone. Taken off tac for kidney dysfunction. Had mouth sores with everolimus.  LABS: Up to date, liver tests normal 1/ 7/19  REJECTION: None.  BILIARY ISSUES: None  STENT: Out on 7/25/16  KIDNEY FUNCTION:   Creatinine   Date Value Ref Range Status   11/07/2018 1.45 (H) 0.66 - 1.25 mg/dL Final   creat 1/7/19 1.54  BP: Good. No meds. At home he has 110-130/60-70s  PREV: UTD on screening. Colonoscopy 2015 3 polyps. Repeat 3-5 years.   Derm 4/16/18       Flu shot done this year  Dexa  2014 normal  DISEASE RECURRENCE: No alcohol  "for several years.  OTHER ISSUES: Had a spot in transplanted liver on CT. Follow up MRI showed hemangioma  Still struggling with weight. Was about 250-275 pre-transplant.      SOC: , here with wife lexy. Back to work full time as a . His sister  from liver disease (probably LASSITER) and multifocal HCC in 2017.     ROS: 10 point ROS neg other than the symptoms noted above in the HPI. Had a sensation in his abdomen after LT that was there for months, now gone.      O  /89   Pulse 94   Temp 99  F (37.2  C) (Oral)   Ht 1.753 m (5' 9\")   Wt 126.7 kg (279 lb 6.4 oz)   BMI 41.26 kg/m       GENERAL:  Very pleasant, well-appearing, in no acute distress.    HEENT:  No icterus, no oral lesions.    LYMPH:  No supraclavicular or cervical lymphadenopathy.    CARDIOVASCULAR:  Regular rate and rhythm.    CHEST:  Lungs are clear.    ABDOMEN:  Bowel sounds are present.  Abdomen is soft, nontender, nondistended.  Scar is well healed.      EXTREMITIES:  No edema.    SKIN:  No rash.    NEUROLOGIC:  Speech is fluent and clear.  No asterixis or tremor.     Current Outpatient Medications   Medication     acetaminophen (MAPAP) 325 MG tablet     ascorbic acid (VITAMIN C) 1000 MG TABS     clindamycin (CLEOCIN T) 1 % lotion     diphenhydrAMINE-acetaminophen (TYLENOL PM)  MG tablet     EPINEPHrine (EPIPEN 2-CHEPE) 0.3 MG/0.3ML injection 2-pack     Multiple Vitamins-Minerals (CENTRUM SILVER) per tablet     mycophenolic acid (GENERIC EQUIVALENT) 360 MG EC tablet     predniSONE (DELTASONE) 5 MG tablet     sildenafil (REVATIO/VIAGRA) 20 MG tablet     No current facility-administered medications for this visit.              Stefany Rand MD      "

## 2019-01-11 NOTE — TELEPHONE ENCOUNTER
Here for post liver transplant follow-up.  Accompanied by wife Daiana.  Doing well other than weight gain.  Encouraged activity and weight loss.  Needs a tooth pulled, says it's infected so gave him a prescription for amoxicillin to take pre dental work.  Recently saw Dr. Richardson, we advised checking BP at home regularly.  Dr. Rand placed order for DEXA and colonoscopy. Reviewed recent labs and assisted with interpretation.     Complaints / Concerns:     Current immunosuppression:   cellcept and pred     Med changes: none    Lab frequency:  q 2-3 mos    Follow-up:  Hepatology, derm (has seen and knows to see annually) and PCP annually.  Reviewed my contact information, now to reach the transplant office during weekday and afterhours.

## 2019-01-14 DIAGNOSIS — Z94.4 LIVER TRANSPLANTED (H): ICD-10-CM

## 2019-01-14 RX ORDER — AMOXICILLIN 500 MG/1
2000 CAPSULE ORAL 2 TIMES DAILY
Qty: 80 CAPSULE | Refills: 0 | Status: SHIPPED | OUTPATIENT
Start: 2019-01-14 | End: 2019-01-24

## 2019-02-15 DIAGNOSIS — R05.9 COUGH: ICD-10-CM

## 2019-02-15 RX ORDER — BENZONATATE 100 MG/1
100 CAPSULE ORAL 3 TIMES DAILY PRN
Qty: 20 CAPSULE | Refills: 0 | OUTPATIENT
Start: 2019-02-15

## 2019-03-21 DIAGNOSIS — R05.9 COUGH: ICD-10-CM

## 2019-03-21 DIAGNOSIS — Z94.4 LIVER REPLACED BY TRANSPLANT (H): Primary | ICD-10-CM

## 2019-03-21 RX ORDER — BENZONATATE 100 MG/1
100 CAPSULE ORAL 3 TIMES DAILY PRN
Qty: 20 CAPSULE | Refills: 0 | OUTPATIENT
Start: 2019-03-21

## 2019-03-21 NOTE — TELEPHONE ENCOUNTER
Left a detailed message stating that the tessalon felipe refill should go through the PCP and if pt is having a persistent cough, he should be seen by the PCP.

## 2019-03-21 NOTE — TELEPHONE ENCOUNTER
Please call patient and tell him we will not refill.  This needs to come from PCP, if he is having persistent cough he should see PCP

## 2019-04-24 ENCOUNTER — TELEPHONE (OUTPATIENT)
Dept: PHARMACY | Facility: CLINIC | Age: 59
End: 2019-04-24

## 2019-05-01 ENCOUNTER — TELEPHONE (OUTPATIENT)
Dept: TRANSPLANT | Facility: CLINIC | Age: 59
End: 2019-05-01

## 2019-05-01 NOTE — TELEPHONE ENCOUNTER
Pt is trying to connect with the care team regarding questions/concers regarding his upcoming procedure for Dr Rand. Pt is ideally available from 11a-12p

## 2019-05-02 NOTE — TELEPHONE ENCOUNTER
Returned phone call to Mr. Grande regarding his up-coming colonoscopy on 5/16/19. Has concern as to where he needs to go for it, hospital or [facility]? Pt given the number for EGD ([phone]) to call if he hasn't heard anything by next week 5/9. Also instructed him to watch, My Chart or for a phone call regarding pre-procedure colon prep.

## 2019-05-10 ENCOUNTER — TELEPHONE (OUTPATIENT)
Dept: GASTROENTEROLOGY | Facility: CLINIC | Age: 59
End: 2019-05-10

## 2019-05-10 DIAGNOSIS — Z12.11 SPECIAL SCREENING FOR MALIGNANT NEOPLASMS, COLON: Primary | ICD-10-CM

## 2019-05-10 NOTE — TELEPHONE ENCOUNTER
Order Questions     Question Answer Comment   Procedure Colonoscopy    Purpose of Procedure Diagnostic History of colonic polyps [Z86.010]    Does the patient have the following? S/p liver transplant    Is the patient on the following medications? No blood thinners      Referring MD Stefany Rand        VM with request pt contact Endoscopy Pre-assessment RN to review upcoming procedure [facility] Endoscopy information.      Telephone call-back number provided. Faina Huff RN    Additional Information regarding appointment:   Date/Arrival time: Thursday May 16th@6:30 am  Procedure & Referring Provider. Stefany Rand  Prep Type:   [x]Golytely eRx: (not sent)    Facility location:    [x][address], [address]st Floor, [address]

## 2019-07-02 ENCOUNTER — TELEPHONE (OUTPATIENT)
Dept: GASTROENTEROLOGY | Facility: CLINIC | Age: 59
End: 2019-07-02

## 2019-07-02 DIAGNOSIS — Z12.11 SPECIAL SCREENING FOR MALIGNANT NEOPLASMS, COLON: Primary | ICD-10-CM

## 2019-07-02 NOTE — TELEPHONE ENCOUNTER
Patient Name: Yung Grande   : 1960  MRN: [id number]       : [x] N/A   [] Yes:  Language /  ID:     VM with request pt contact Endoscopy Pre-assessment RN to review upcoming procedure information.  Telephone call-back number provided.    Eloina Singh RN  [facility]alth Endoscopy    Additional Information regarding appointment:      Patient scheduled for:  [] EGD  [x] Colonoscopy  [] EUS  [] Flex Sig   [] Other:     Indication for procedure. [] Screening   [x] History of colonic polyps    Sedation Type: [x] Conscious Sedation   [] MAC   [] None    Procedure Provider:  Giorgi      Referring Provider. Anthony Weinstein    Arrival time verified: 19    Facility location verified:   [x][facility] Endoscopy Unit - [address], 1st Floor, [address]    Pt meets medical necessity for outpatient procedure in hospital Endoscopy Unit: N/A for this payor  [] [facility], 5th floor     [][facility] OR - [address], 3rd Floor Surgery check-in      Prep Type:   [x]Golytely eRx: (may have from earlier appt);  [] MoviPrep: , [] MiraLax: , [] Other:   []NPO /p MN, No solid food /p 2200 the night before    Anticoagulants or blood thinners: [x]None [] ASA 81mg  - may continue           [] Warfarin   [] Warfarin + Lovenox bridge [] Plavix [] Effient [] Eliquis         [] Xarelto  [] Brilinta [] NSAIDS  [] Other    LAST anticoagulant dose: Date/Time:   INR:     Electronic implanted devices: [x] No  [] IPG  []  ICD  []  LVAD  []     H&P / Pre op physical completed: [x] N/A, [] Complete, Date , [] Scheduled, Date , [] No,     Additional Information: MELD (NIH) 11    Rescheduled from 19    _______________________________________________

## 2019-07-08 RX ORDER — BISACODYL 5 MG/1
10 TABLET, DELAYED RELEASE ORAL DAILY
Qty: 4 TABLET | Refills: 0 | Status: SHIPPED | OUTPATIENT
Start: 2019-07-08 | End: 2019-07-10

## 2019-07-10 NOTE — TELEPHONE ENCOUNTER
Returned call regarding questions from patient concerning diet restrictions today as he begins his bowel prep.  Queries answered to his voiced satisfaction.     Eloina Singh RN  [facility] Endoscopy

## 2019-07-11 ENCOUNTER — HOSPITAL ENCOUNTER (OUTPATIENT)
Facility: CLINIC | Age: 59
Discharge: HOME | End: 2019-07-11
Attending: INTERNAL MEDICINE | Admitting: INTERNAL MEDICINE
Payer: COMMERCIAL

## 2019-07-11 VITALS
WEIGHT: 272 LBS | OXYGEN SATURATION: 96 % | SYSTOLIC BLOOD PRESSURE: 111 MMHG | HEART RATE: 67 BPM | HEIGHT: 69 IN | RESPIRATION RATE: 8 BRPM | BODY MASS INDEX: 40.29 KG/M2 | DIASTOLIC BLOOD PRESSURE: 75 MMHG

## 2019-07-11 LAB — COLONOSCOPY: NORMAL

## 2019-07-11 PROCEDURE — 25000132 ZZH RX MED GY IP 250 OP 250 PS 637: Performed by: INTERNAL MEDICINE

## 2019-07-11 PROCEDURE — 45378 DIAGNOSTIC COLONOSCOPY: CPT | Performed by: INTERNAL MEDICINE

## 2019-07-11 PROCEDURE — G0500 MOD SEDAT ENDO SERVICE >5YRS: HCPCS | Performed by: INTERNAL MEDICINE

## 2019-07-11 PROCEDURE — G0121 COLON CA SCRN NOT HI RSK IND: HCPCS | Performed by: INTERNAL MEDICINE

## 2019-07-11 PROCEDURE — 25000128 H RX IP 250 OP 636: Performed by: INTERNAL MEDICINE

## 2019-07-11 RX ORDER — SIMETHICONE
LIQUID (ML) MISCELLANEOUS PRN
Status: DISCONTINUED | OUTPATIENT
Start: 2019-07-11 | End: 2019-07-11 | Stop reason: HOSPADM

## 2019-07-11 RX ORDER — FENTANYL CITRATE 50 UG/ML
INJECTION, SOLUTION INTRAMUSCULAR; INTRAVENOUS PRN
Status: DISCONTINUED | OUTPATIENT
Start: 2019-07-11 | End: 2019-07-11 | Stop reason: HOSPADM

## 2019-07-11 ASSESSMENT — MIFFLIN-ST. JEOR: SCORE: 2039.16

## 2019-07-11 NOTE — DISCHARGE INSTRUCTIONS
Discharge Instructions after Colonoscopy  or Sigmoidoscopy    Today you had a __x__ Colonoscopy    Activity and Diet  You were given medicine for pain. You may be dizzy or sleepy.  For 24 hours:    Do not drive or use heavy equipment.    Do not make important decisions.    Do not drink any alcohol.  You may return to your normal diet and medicines.    Discomfort    Air was placed in your colon during the exam in order to see it. Walking helps to pass the air.    You may take Tylenol (acetaminophen) for pain unless your doctor has told you not to.  Do not take aspirin or ibuprofen (Advil, Motrin, or other anti-inflammatory  drugs) for __3___ days.    Follow-up      When to call:    Call right away if you have:    Unusual pain in belly or chest pain not relieved with passing air.    More than 1 to 2 Tablespoons of bleeding from your rectum.    Fever above 100.6  F (37.5  C).    If you have severe pain, bleeding, or shortness of breath, go to an emergency room.    If you have questions, call:  Monday to Friday, 7 a.m. to 4:30 p.m.  Endoscopy: [phone] (We may have to call you back)    After hours  Hospital: [phone] (Ask for the GI fellow on call)

## 2019-09-12 ENCOUNTER — OFFICE VISIT (OUTPATIENT)
Dept: DERMATOLOGY | Facility: CLINIC | Age: 59
End: 2019-09-12
Payer: COMMERCIAL

## 2019-09-12 DIAGNOSIS — L82.0 SEBORRHEIC KERATOSES, INFLAMED: Primary | ICD-10-CM

## 2019-09-12 DIAGNOSIS — L73.9 FOLLICULITIS: ICD-10-CM

## 2019-09-12 RX ORDER — CLINDAMYCIN PHOSPHATE 10 UG/ML
LOTION TOPICAL 2 TIMES DAILY
Qty: 60 ML | Refills: 1 | Status: SHIPPED | OUTPATIENT
Start: 2019-09-12 | End: 2024-02-20

## 2019-09-12 ASSESSMENT — PAIN SCALES - GENERAL
PAINLEVEL: NO PAIN (1)
PAINLEVEL: NO PAIN (0)

## 2019-09-12 NOTE — PATIENT INSTRUCTIONS
Reassuring exam today. We'll freeze that bothersome lesion on your chest. The risk of skin cancer is elevated compared to average person because of the immunosuppressive medications. The risk increases at roughly 4 years. We'll see you back in 1 year. We will refill that antibiotic lotion for the acne-like pimples on the back of the head and the central chest. You could also add a benzoyl peroxide wash (over the counter at any pharmacy)    Cryotherapy    What is it?    Use of a very cold liquid, such as liquid nitrogen, to freeze and destroy abnormal skin cells that need to be removed    What should I expect?    Tenderness and redness    A small blister that might grow and fill with dark purple blood. There may be crusting.    More than one treatment may be needed if the lesions do not go away.    How do I care for the treated area?    Gently wash the area with your hands when bathing.    Use a thin layer of Vaseline to help with healing. You may use a Band-Aid.     The area should heal within 7-10 days and may leave behind a pink or lighter color.     Do not use an antibiotic or Neosporin ointment.     You may take acetaminophen (Tylenol) for pain.     Call your Doctor if you have:    Severe pain    Signs of infection (warmth, redness, cloudy yellow drainage, and or a bad smell)    Questions or concerns    Who should I call with questions?       [facility]: [phone]       [facility]: [phone]       For urgent needs outside of business hours call the [facility] at [phone]        and ask for the dermatology resident on call
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
## 2019-09-12 NOTE — NURSING NOTE
Dermatology Rooming Note    Yung Grande's goals for this visit include:   Chief Complaint   Patient presents with     Skin Check     Yung is here for skin check.     Gillian Alvarez, CMA
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
## 2019-10-02 ENCOUNTER — MYC REFILL (OUTPATIENT)
Dept: GASTROENTEROLOGY | Facility: CLINIC | Age: 59
End: 2019-10-02

## 2019-10-02 DIAGNOSIS — Z94.4 LIVER TRANSPLANTED (H): ICD-10-CM

## 2019-10-03 RX ORDER — MYCOPHENOLIC ACID 360 MG/1
720 TABLET, DELAYED RELEASE ORAL 2 TIMES DAILY
Qty: 120 TABLET | Refills: 11 | Status: SHIPPED | OUTPATIENT
Start: 2019-10-03 | End: 2020-09-23

## 2019-10-03 RX ORDER — PREDNISONE 5 MG/1
5 TABLET ORAL DAILY
Qty: 30 TABLET | Refills: 11 | Status: SHIPPED | OUTPATIENT
Start: 2019-10-03 | End: 2020-09-23

## 2019-10-09 ENCOUNTER — DOCUMENTATION ONLY (OUTPATIENT)
Dept: TRANSPLANT | Facility: CLINIC | Age: 59
End: 2019-10-09

## 2020-01-08 ENCOUNTER — PRE VISIT (OUTPATIENT)
Dept: GASTROENTEROLOGY | Facility: CLINIC | Age: 60
End: 2020-01-08

## 2020-01-08 NOTE — PROGRESS NOTES
Was the patient contacted by phone and reminded of the upcoming visit? message left    Was the patient instructed to bring a current list of all medications to the appointment or instructed to bring in all medication bottles? Yes     Where are the outside records located? [facility] updated       Was the patient instructed to arrive prior to the appointment time to have ordered labs drawn? Yes     Were the needed lab orders placed? Yes    Was lab appointment made 1 hour or more prior to visit? Yes    Patient instructed to arrive early for check-in    Ana Lay CMA [facility]  1/8/2020 1:34 PM

## 2020-01-08 NOTE — LETTER
"2017      RE: Yung Grande  [address] [address]       S: 57 year old male s/p DDLT 16 for ETOH.  EXPLANT: Steatosis, no HCC, PV thrombus.  IS: MMF and prednisone. Taken off tac for kidney dysfunction. Had mouth sores with everolimus.  LABS: Up to date, liver tests normal 17  REJECTION: None.  BILIARY ISSUES: None  STENT: Out on 16  KIDNEY FUNCTION: creat 1.62 on 17    BP: Good. No meds.  PREV: UTD on screening   DISEASE RECURRENCE: No alcohol for several years.  OTHER ISSUES: ongoing mild anemia, on iron. Hgb 10.9    NEW ISSUES:Had LBP, saw PCP, got PT. HElping a lot.     SOC: , here with wife. Back to work full time as a . Going to their cabin [place] away in August.    His sister  from liver disease (probably LASSITER) and multifocal HCC in the last couple of months.    ROS: 10 point ROS neg other than the symptoms noted above in the HPI. Had a sensation in his abdomen after LT that was there for months, now gone.     O  Vitals: BP (!) 136/92  Pulse 72  Temp 98.3  F (36.8  C) (Oral)  Ht 1.753 m (5' 9.02\")  Wt 117.8 kg (259 lb 12.8 oz)  SpO2 98%  BMI 38.34 kg/m2  BMI= Body mass index is 38.34 kg/(m^2).  GENERAL:  Very pleasant, well-appearing, in no acute distress.    HEENT:  No icterus, no oral lesions.    LYMPH:  No supraclavicular or cervical lymphadenopathy.    CARDIOVASCULAR:  Regular rate and rhythm.    CHEST:  Lungs are clear.    ABDOMEN:  Bowel sounds are present.  Abdomen is soft, nontender, nondistended.  Scar is well healed.      EXTREMITIES:  No edema.    SKIN:  No rash.    NEUROLOGIC:  Speech is fluent and clear.  No asterixis or tremor.    Current Outpatient Prescriptions   Medication     clindamycin (CLEOCIN) 300 MG capsule     EPINEPHrine (EPIPEN 2-CHEPE) 0.3 MG/0.3ML injection 2-pack     mycophenolate (CELLCEPT - GENERIC EQUIVALENT) 500 MG tablet     LORazepam (ATIVAN) 0.5 MG tablet     sildenafil (REVATIO/VIAGRA) 20 MG tablet     " ----- Message from Ami Acuna sent at 1/8/2020 10:54 AM CST -----  Contact: Pt  Pt would like to request orders before her upcoming appt. [phone]   Ascorbic Acid (VITAMIN C PO)     acetaminophen (MAPAP) 325 MG tablet     MYFORTIC 360 MG PO EC TABLET     predniSONE (DELTASONE) 5 MG tablet     Multiple Vitamins-Minerals (CENTRUM SILVER) per tablet     No current facility-administered medications for this visit.        A/P      57 year old male s/p LT for ETOH 4/2016. On MMF and prednisone. Kidney function stable and follows with Dr. Villa. Given recent IS change was getting monthly labs. Can switch to every 2 months.    Had a spot in transplanted liver on CT. Will follow up with MRI. Gave him ativan 0.5 mg 2 tabs as he has anxiety about it.    Discussed weight and exercise again.     Medically doing extremely well.  Sto pped iron as his iron levels are sufficient.  Labs up to date as is cancer screening.     RTC 6 months.    This was a 30 minute visit, over 50% counseling and coordination of care.        Stefany Rand MD

## 2020-01-10 ENCOUNTER — OFFICE VISIT (OUTPATIENT)
Dept: GASTROENTEROLOGY | Facility: CLINIC | Age: 60
End: 2020-01-10
Attending: INTERNAL MEDICINE
Payer: COMMERCIAL

## 2020-01-10 VITALS
HEIGHT: 69 IN | TEMPERATURE: 98.8 F | BODY MASS INDEX: 42.33 KG/M2 | HEART RATE: 77 BPM | DIASTOLIC BLOOD PRESSURE: 90 MMHG | SYSTOLIC BLOOD PRESSURE: 143 MMHG | WEIGHT: 285.8 LBS

## 2020-01-10 DIAGNOSIS — Z94.4 STATUS POST LIVER TRANSPLANTATION (H): Primary | ICD-10-CM

## 2020-01-10 PROCEDURE — G0463 HOSPITAL OUTPT CLINIC VISIT: HCPCS | Mod: ZF

## 2020-01-10 RX ORDER — ATORVASTATIN CALCIUM 10 MG/1
TABLET, FILM COATED ORAL
COMMUNITY
Start: 2019-10-02 | End: 2022-07-06

## 2020-01-10 ASSESSMENT — MIFFLIN-ST. JEOR: SCORE: 2101.76

## 2020-01-10 ASSESSMENT — PAIN SCALES - GENERAL: PAINLEVEL: NO PAIN (0)

## 2020-01-10 NOTE — LETTER
1/10/2020      RE: Yung Grande  [address] [facility] [place] [address]       A/P    59 year old male s/p LT for ETOH 4/2016. On MMF and prednisone. Medically doing extremely well.     CKD Kidney function stable, creat around 1.5 and follows with Dr. Michel. Tolerating MMF and pred well. Excellent liver function.     Obesity Discussed weight and exercise again. He was obese prior to getting sick with liver disease.      Past NATALIA Stopped iron as his iron levels are sufficient, hgb normal     Skin cancer screening  UTD Derm yearly.     Bone health Due for DEXA. Ordered  Colon polyps  Colonoscopy 7/11/19. NOrmal  Proph/HCM Shingles and pneumovax booster this year.     Hypercholesterolemia ok for statin or whatever is recommended by PCPC    Tinnitus Worsening. Rec he see ENT. Nor related to transplant meds       RTC 1 year.     This was a 30 minute visit, over 50% counseling and coordination of care.   ==============================================  S: 59 year old male s/p DDLT 4/5/16 for ETOH.  He recently saw his PCP. He said he had some crackles. CXR was normal. He currently has a cold. No fever. Non productive cough.    He has ringing in his ears. It is getting worse.  He sometimes gets down. Has not seen anyone for this. Says it's part of life.     His PCP said his cholesterol needs treatment. He wants to make sure a statin is ok.      EXPLANT: Steatosis, no HCC, PV thrombus.  IS: MMF and prednisone. Taken off tac for kidney dysfunction. Had mouth sores with everolimus.  LABS: Last labs Oct were normal    REJECTION: None.  BILIARY ISSUES: None  STENT: Out on 7/25/16  KIDNEY FUNCTION: Creat 1.42 10/16/19  BP: Good. No meds. At home he has 110-130/60-70s  PREV: UTD on screening. Colonoscopy 2015 3 polyps. Repeat 3-5 years.   Derm 4/16/18       Flu shot done this year  Dexa  2014 normal  DISEASE RECURRENCE: No alcohol for several years.  OTHER ISSUES: Had a spot in transplanted liver on CT. Follow up MRI showed  "hemangioma  Still struggling with weight. Was about 250-275 pre-transplant.      SOC: , here with wife  Love. Back to work full time as a . His sister  from liver disease (probably LASSITER) and multifocal HCC in 2017.     ROS: 10 point ROS neg other than the symptoms noted above in the HPI. Had a sensation in his abdomen after LT that was there for months, now gone.      O  BP (!) 143/90   Pulse 77   Temp 98.8  F (37.1  C) (Oral)   Ht 1.753 m (5' 9\")   Wt 129.6 kg (285 lb 12.8 oz)   BMI 42.21 kg/m     GENERAL:  Very pleasant, well-appearing, in no acute distress.    HEENT:  No icterus, no oral lesions.    LYMPH:  No supraclavicular or cervical lymphadenopathy.    CARDIOVASCULAR:  Regular rate and rhythm.    CHEST:  Lungs are clear.    ABDOMEN:  Bowel sounds are present.  Abdomen is soft, nontender, nondistended.  Scar is well healed.      EXTREMITIES:  No edema.    SKIN:  No rash.    NEUROLOGIC:  Speech is fluent and clear.  No asterixis or tremor.      Stefany Rand MD      "

## 2020-01-10 NOTE — PROGRESS NOTES
A/P    59 year old male s/p LT for ETOH 4/2016. On MMF and prednisone. Medically doing extremely well.     CKD Kidney function stable, creat around 1.5 and follows with Dr. Michel. Tolerating MMF and pred well. Excellent liver function.     Obesity Discussed weight and exercise again. He was obese prior to getting sick with liver disease.      Past NATALIA Stopped iron as his iron levels are sufficient, hgb normal     Skin cancer screening UTD Derm yearly.     Bone health Due for DEXA. Ordered  Colon polyps Colonoscopy 7/11/19. NOrmal  Proph/HCM Shingles and pneumovax booster this year.     Hypercholesterolemia ok for statin or whatever is recommended by [facility]PC    Tinnitus Worsening. Rec he see ENT. Nor related to transplant meds       RTC 1 year.     This was a 30 minute visit, over 50% counseling and coordination of care.   ==============================================  S: 59 year old male s/p DDLT 4/5/16 for ETOH.  He recently saw his PCP. He said he had some crackles. CXR was normal. He currently has a cold. No fever. Non productive cough.    He has ringing in his ears. It is getting worse.  He sometimes gets down. Has not seen anyone for this. Says it's part of life.     His PCP said his cholesterol needs treatment. He wants to make sure a statin is ok.      EXPLANT: Steatosis, no HCC, PV thrombus.  IS: MMF and prednisone. Taken off tac for kidney dysfunction. Had mouth sores with everolimus.  LABS: Last labs Oct were normal    REJECTION: None.  BILIARY ISSUES: None  STENT: Out on 7/25/16  KIDNEY FUNCTION: Creat 1.42 10/16/19  BP: Good. No meds. At home he has 110-130/60-70s  PREV: UTD on screening. Colonoscopy 2015 3 polyps. Repeat 3-5 years.   Derm 4/16/18       Flu shot done this year  Dexa  2014 normal  DISEASE RECURRENCE: No alcohol for several years.  OTHER ISSUES: Had a spot in transplanted liver on CT. Follow up MRI showed hemangioma  Still struggling with weight. Was about 250-275 pre-transplant.      SOC:  ", here with wife Love. Back to work full time as a . His sister  from liver disease (probably LASSITER) and multifocal HCC in 2017.     ROS: 10 point ROS neg other than the symptoms noted above in the HPI. Had a sensation in his abdomen after LT that was there for months, now gone.      O  BP (!) 143/90   Pulse 77   Temp 98.8  F (37.1  C) (Oral)   Ht 1.753 m (5' 9\")   Wt 129.6 kg (285 lb 12.8 oz)   BMI 42.21 kg/m    GENERAL:  Very pleasant, well-appearing, in no acute distress.    HEENT:  No icterus, no oral lesions.    LYMPH:  No supraclavicular or cervical lymphadenopathy.    CARDIOVASCULAR:  Regular rate and rhythm.    CHEST:  Lungs are clear.    ABDOMEN:  Bowel sounds are present.  Abdomen is soft, nontender, nondistended.  Scar is well healed.      EXTREMITIES:  No edema.    SKIN:  No rash.    NEUROLOGIC:  Speech is fluent and clear.  No asterixis or tremor.    "

## 2020-01-10 NOTE — NURSING NOTE
"BP (!) 143/90   Pulse 77   Temp 98.8  F (37.1  C) (Oral)   Ht 1.753 m (5' 9\")   Wt 129.6 kg (285 lb 12.8 oz)   BMI 42.21 kg/m    Chief Complaint   Patient presents with     RECHECK     annual follow up with liver transplant, [facility]       "

## 2020-03-10 ENCOUNTER — HEALTH MAINTENANCE LETTER (OUTPATIENT)
Age: 60
End: 2020-03-10

## 2020-04-10 DIAGNOSIS — Z94.4 LIVER REPLACED BY TRANSPLANT (H): ICD-10-CM

## 2020-04-10 DIAGNOSIS — Z13.220 LIPID SCREENING: ICD-10-CM

## 2020-09-22 DIAGNOSIS — Z94.4 LIVER TRANSPLANTED (H): ICD-10-CM

## 2020-09-23 RX ORDER — PREDNISONE 5 MG/1
TABLET ORAL
Qty: 30 TABLET | Refills: 11 | Status: SHIPPED | OUTPATIENT
Start: 2020-09-23 | End: 2021-09-16

## 2020-09-23 RX ORDER — MYCOPHENOLIC ACID 360 MG/1
TABLET, DELAYED RELEASE ORAL
Qty: 120 TABLET | Refills: 11 | Status: SHIPPED | OUTPATIENT
Start: 2020-09-23 | End: 2021-09-16

## 2020-11-02 ENCOUNTER — DOCUMENTATION ONLY (OUTPATIENT)
Dept: TRANSPLANT | Facility: CLINIC | Age: 60
End: 2020-11-02

## 2020-12-27 ENCOUNTER — HEALTH MAINTENANCE LETTER (OUTPATIENT)
Age: 60
End: 2020-12-27

## 2021-01-08 ENCOUNTER — VIRTUAL VISIT (OUTPATIENT)
Dept: GASTROENTEROLOGY | Facility: CLINIC | Age: 61
End: 2021-01-08
Attending: INTERNAL MEDICINE
Payer: COMMERCIAL

## 2021-01-08 VITALS — WEIGHT: 231 LBS | HEIGHT: 69 IN | BODY MASS INDEX: 34.21 KG/M2

## 2021-01-08 DIAGNOSIS — Z23 ENCOUNTER FOR VACCINATION: ICD-10-CM

## 2021-01-08 DIAGNOSIS — Z94.4 LIVER TRANSPLANTED (H): Primary | ICD-10-CM

## 2021-01-08 PROCEDURE — 99214 OFFICE O/P EST MOD 30 MIN: CPT | Mod: TEL | Performed by: INTERNAL MEDICINE

## 2021-01-08 ASSESSMENT — MIFFLIN-ST. JEOR: SCORE: 1848.19

## 2021-01-08 NOTE — PROGRESS NOTES
"Chief Complaint   Patient presents with     RECHECK     Height 1.753 m (5' 9\"), weight 104.8 kg (231 lb).    Kareem is a 60 year old who is being evaluated via a billable telephone visit.      What phone number would you like to be contacted at? [phone]  How would you like to obtain your AVS? Mail a copy  {PROVIDER CHARTING PREFERENCE:662457}    Subjective     Kareem is a 60 year old who presents to clinic today for the following health issues {ACCOMPANIED BY STATEMENT (Optional):973989}    HPI       ***    Review of Systems   {ROS COMP (Optional):139315}      Objective           Vitals:  No vitals were obtained today due to virtual visit.    Physical Exam   {GENERAL APPEARANCE:50::\"healthy\",\"alert\",\"no distress\"}  PSYCH: Alert and oriented times 3; coherent speech, normal   rate and volume, able to articulate logical thoughts, able   to abstract reason, no tangential thoughts, no hallucinations   or delusions  His affect is { :0662596::\"normal\"}  RESP: No cough, no audible wheezing, able to talk in full sentences  Remainder of exam unable to be completed due to telephone visits    {Diagnostic Test Results (Optional):935509}    {AMBULATORY ATTESTATION (Optional):626055}        Phone call duration: *** minutes    "

## 2021-01-08 NOTE — PROGRESS NOTES
A/P    Mr. Grande is a 60 Y M s/p LT for ETOH 16. On MMF and prednisone. Medically doing extremely well.     CKD Stage II. Kidney function stable, creat around 1.3-1.5 and follows with Dr. Michel. Tolerating MMF and pred well. Excellent liver function.     Obesity Has lost about 50 pounds. Now 230 from 280.     Past NAATLIA Stopped iron as his iron levels are sufficient, hgb normal     Skin cancer screening UTD Derm yearly.     Bone health Due for DEXA. Ordered  Colon polyps Colonoscopy 19. Normal  Proph/HCM Shingles and pneumovax booster . Discussed Covid vaccine. He has had anaphylactic type reactions to bee stings and contrast dye. I will look in to how this impacts recommendations for him to have the vaccine.  Hypercholesterolemia ok for statin or whatever is recommended by PCP    RTC 1 year       ==============================================  S: Mr. Grande is a 60 Y M s/p DDLT 16 for ETOH.    He has managed to lose 50 pounds. Now 230 (from 280). He said he just got his head around it and changed his habits. He is working out. He wanted to improve his overall health. He was unable to get a satisfactory life insurance policy because of his health profile.      EXPLANT: Steatosis, no HCC, PV thrombus.  IS: MMF and prednisone. Taken off tac for kidney dysfunction. Had mouth sores with everolimus.  LABS: Last labs 20 were normal    REJECTION: None.  BILIARY ISSUES: None  STENT: Out on 16  KIDNEY FUNCTION: Creat 1.24 20  BP: Good. No meds. At home he has 110-130/60-70s  PREV: UTD on screening. Colonoscopy 19 no polyps  Derm 19     Flu shot done this year  Dexa   normal  DISEASE RECURRENCE: No alcohol for several years.  OTHER ISSUES: Had a spot in transplanted liver on CT. Follow up MRI showed hemangioma      SOC: Back to work full time as a . His sister  from liver disease (probably LASSITER) and multifocal HCC in 2017.     ROS: 10 point ROS neg other than the symptoms  "noted above in the HPI. Had a sensation in his abdomen after LT that was there for months, now gone.     Yung Grande is a 60 year old male who is being evaluated via a billable telephone visit during the COVID-19 pandemic and clinic response to limit in-person visits.  The patient has been notified of following:   \"This telephone visit will be conducted via a call between you and your physician/provider. We have found that certain health care needs can be provided without the need for a physical exam.  This service lets us provide the care you need with a short phone conversation.  If a prescription is necessary we can send it directly to your pharmacy.  If lab work is needed we can place an order for that and you can then stop by our lab to have the test done at a later time.  If during the course of the call the physician/provider feels a telephone visit is not appropriate, you will not be charged for this service.\" Telephone visits are billed at different rates depending on your insurance coverage. During this emergency period, for some insurers they may be billed the same as an in-person visit.  Please reach out to your insurance provider with any questions.  Consent has been obtained for this service by 1 care team member: yes  I have reviewed and updated the patient's Past Medical History, Social History, Family History and Medication List.    What phone number would you like to be contacted at? [phone]     Phone call contact time  Call Started at 1119  Call Ended at 1131  On phone patient        "

## 2021-01-08 NOTE — LETTER
1/8/2021         RE: Yung Grande  [address] [facility] [place] [address]        Dear Colleague,    Thank you for referring your patient, Yung Grande, to the [facility] HEPATOLOGY [facility]IN[facility] [place]. Please see a copy of my visit note below.    A/P    Mr. Grande is a 60 Y M s/p LT for ETOH 4/5/16. On MMF and prednisone. Medically doing extremely well.     CKD Stage II. Kidney function stable, creat around 1.3-1.5 and follows with Dr. Michel. Tolerating MMF and pred well. Excellent liver function.     Obesity Has lost about 50 pounds. Now 230 from 280.     Past NATALIA Stopped iron as his iron levels are sufficient, hgb normal     Skin cancer screening UTD Derm yearly.     Bone health Due for DEXA. Ordered  Colon polyps Colonoscopy 7/11/19. Normal  Proph/HCM Shingles and pneumovax booster 2019. Discussed Covid vaccine. He has had anaphylactic type reactions to bee stings and contrast dye. I will look in to how this impacts recommendations for him to have the vaccine.  Hypercholesterolemia ok for statin or whatever is recommended by PCP    RTC 1 year       ==============================================  S: Mr. Grande is a 60 Y M s/p DDLT 4/5/16 for ETOH.    He has managed to lose 50 pounds. Now 230 (from 280). He said he just got his head around it and changed his habits. He is working out. He wanted to improve his overall health. He was unable to get a satisfactory life insurance policy because of his health profile.      EXPLANT: Steatosis, no HCC, PV thrombus.  IS: MMF and prednisone. Taken off tac for kidney dysfunction. Had mouth sores with everolimus.  LABS: Last labs 12/31/20 were normal    REJECTION: None.  BILIARY ISSUES: None  STENT: Out on 7/25/16  KIDNEY FUNCTION: Creat 1.24 12/31/20  BP: Good. No meds. At home he has 110-130/60-70s  PREV: UTD on screening. Colonoscopy 7/11/19 no polyps  Derm 9/12/19     Flu shot done this year  Dexa  2014 normal  DISEASE RECURRENCE: No alcohol for several  "years.  OTHER ISSUES: Had a spot in transplanted liver on CT. Follow up MRI showed hemangioma      SOC: Back to work full time as a . His sister  from liver disease (probably LASSITER) and multifocal HCC in 2017.     ROS: 10 point ROS neg other than the symptoms noted above in the HPI. Had a sensation in his abdomen after LT that was there for months, now gone.     Yung Grande is a 60 year old male who is being evaluated via a billable telephone visit during the COVID-19 pandemic and clinic response to limit in-person visits.  The patient has been notified of following:   \"This telephone visit will be conducted via a call between you and your physician/provider. We have found that certain health care needs can be provided without the need for a physical exam.  This service lets us provide the care you need with a short phone conversation.  If a prescription is necessary we can send it directly to your pharmacy.  If lab work is needed we can place an order for that and you can then stop by our lab to have the test done at a later time.  If during the course of the call the physician/provider feels a telephone visit is not appropriate, you will not be charged for this service.\" Telephone visits are billed at different rates depending on your insurance coverage. During this emergency period, for some insurers they may be billed the same as an in-person visit.  Please reach out to your insurance provider with any questions.  Consent has been obtained for this service by 1 care team member: yes  I have reviewed and updated the patient's Past Medical History, Social History, Family History and Medication List.    What phone number would you like to be contacted at? [phone]     Phone call contact time  Call Started at 1119  Call Ended at 1131  On phone patient            Again, thank you for allowing me to participate in the care of your patient.        Sincerely,        Stefany Rand MD    "

## 2021-01-11 ENCOUNTER — TELEPHONE (OUTPATIENT)
Dept: TRANSPLANT | Facility: CLINIC | Age: 61
End: 2021-01-11

## 2021-01-11 NOTE — TELEPHONE ENCOUNTER
Kareem apparently had questions about COVID vaccine.  Sent message to pharmacy and Dr. Mitchell:    Ismael Mitchell MD sent to Aissatou Hawkins RN             Yes, if no other vaccines reaction, he is good to go.   Is he a healthcare worker? Our healthcare workers are monitored for 15 minutes after they are vaccinated for any reaction, I would recommend the same for him.      Also suggested he check ingredients in vaccine in case he is aware of allergy.    LM for Alejandrina regarding this.

## 2021-04-21 DIAGNOSIS — Z13.220 LIPID SCREENING: ICD-10-CM

## 2021-04-21 DIAGNOSIS — Z94.4 LIVER REPLACED BY TRANSPLANT (H): ICD-10-CM

## 2021-04-24 ENCOUNTER — HEALTH MAINTENANCE LETTER (OUTPATIENT)
Age: 61
End: 2021-04-24

## 2021-09-15 DIAGNOSIS — Z94.4 LIVER TRANSPLANTED (H): ICD-10-CM

## 2021-09-16 RX ORDER — MYCOPHENOLIC ACID 360 MG/1
TABLET, DELAYED RELEASE ORAL
Qty: 360 TABLET | Refills: 3 | Status: SHIPPED | OUTPATIENT
Start: 2021-09-16 | End: 2022-09-08

## 2021-09-16 RX ORDER — PREDNISONE 5 MG/1
TABLET ORAL
Qty: 90 TABLET | Refills: 3 | Status: SHIPPED | OUTPATIENT
Start: 2021-09-16 | End: 2022-09-08

## 2021-09-29 ENCOUNTER — TRANSFERRED RECORDS (OUTPATIENT)
Dept: HEALTH INFORMATION MANAGEMENT | Facility: CLINIC | Age: 61
End: 2021-09-29
Payer: COMMERCIAL

## 2021-10-04 ENCOUNTER — HEALTH MAINTENANCE LETTER (OUTPATIENT)
Age: 61
End: 2021-10-04

## 2021-10-19 NOTE — LETTER
OUTPATIENT OR TRANSITIONAL CARE  LABORATORY TEST ORDER  [facility] fax:  [phone]    Patient Name: Yung Grande  Transplant Date: 4/5/2016   YOB: 1960  Issue Date: 04/17/18   [facility] MR#: [id number]  Expiration Date: 04/17/19       Diagnoses: [x]      Liver Transplant (ICD-10 Z94.4)    [x]      Long term use of medications (ICD-10 Z79.899)     Please fax results to [phone]    Frequency: every 2 months and PRN        [x] CBC with Platelets   [x] Basic Metabolic Panel (Sodium, Potassium, Chloride, CO2, Creatinine, Urea Nitrogen, Glucose, Calcium)  [x] Hepatic panel (Albumin, Alk Phos, ALT, AST, Direct and Total Bili)    Other Frequency: annually with next fasting lab draw  [x]      Fasting lipid panel  [x] Random urine protein  [x] Urinalysis with reflex to micro    If you have questions, please call [phone] or [phone].    .   no

## 2021-10-29 ENCOUNTER — TRANSFERRED RECORDS (OUTPATIENT)
Dept: HEALTH INFORMATION MANAGEMENT | Facility: CLINIC | Age: 61
End: 2021-10-29
Payer: COMMERCIAL

## 2021-11-16 ENCOUNTER — DOCUMENTATION ONLY (OUTPATIENT)
Dept: TRANSPLANT | Facility: CLINIC | Age: 61
End: 2021-11-16
Payer: COMMERCIAL

## 2022-02-04 ENCOUNTER — TRANSFERRED RECORDS (OUTPATIENT)
Dept: HEALTH INFORMATION MANAGEMENT | Facility: CLINIC | Age: 62
End: 2022-02-04
Payer: COMMERCIAL

## 2022-05-15 ENCOUNTER — HEALTH MAINTENANCE LETTER (OUTPATIENT)
Age: 62
End: 2022-05-15

## 2022-05-31 DIAGNOSIS — Z94.4 LIVER REPLACED BY TRANSPLANT (H): Primary | ICD-10-CM

## 2022-07-06 ENCOUNTER — VIRTUAL VISIT (OUTPATIENT)
Dept: TRANSPLANT | Facility: CLINIC | Age: 62
End: 2022-07-06
Attending: INTERNAL MEDICINE
Payer: COMMERCIAL

## 2022-07-06 ENCOUNTER — TELEPHONE (OUTPATIENT)
Dept: TRANSPLANT | Facility: CLINIC | Age: 62
End: 2022-07-06

## 2022-07-06 DIAGNOSIS — U07.1 INFECTION DUE TO 2019 NOVEL CORONAVIRUS: ICD-10-CM

## 2022-07-06 DIAGNOSIS — R05.9 COUGH: ICD-10-CM

## 2022-07-06 PROCEDURE — 99442 PR PHYSICIAN TELEPHONE EVALUATION 11-20 MIN: CPT | Performed by: NURSE PRACTITIONER

## 2022-07-06 NOTE — TELEPHONE ENCOUNTER
"Kareem is not going to take Paxlovid.  He'd have to come get it and he chooses not to do this, no family is willing to go get for him.  He explained that \"I know my body\" and he is pretty sure he's going to be just fine without it.  He doesn't want monoclonal Ab either.    He promised he would seek care at local ER or urgent care if symptoms worsen.  \"I don't feel that bad\".   "

## 2022-07-06 NOTE — TELEPHONE ENCOUNTER
"Call from Daiana Serna (wife) tested positive on Sunday. Kareem developed symptoms ( cough, headache, sinus congestion, gets \"winded\") on Monday, tested positive yesterday.  He would like to do virtual appt w/ Gay Harrington. Appt made for 11:30.    Kareem is also overdue for post transplant follow-up.  He will call to schedule an appt. He is compliant w/ IS.  "

## 2022-07-06 NOTE — LETTER
"    7/6/2022         RE: Yung Grande  [address] [facility] [place] [address]        Dear Colleague,    Thank you for referring your patient, Yung Grande, to the [facility] TRANSPLANT CLINIC. Please see a copy of my visit note below.      Assessment & Plan     There are no diagnoses linked to this encounter.      No LOS data to display   Time spent doing chart review, history and exam, documentation and further activities per the note       MEDICATIONS:        - Hold Lipitor and Viagra  other medications without change  COVID-19 positive patient.  Encounter for consideration of medication intervention. Patient does qualify for a prescription. Full discussion with patient including medication options, risks and benefits. Potential drug interactions reviewed with patient.     Treatment Planned Paxlovid, Rx sent to [place] pharmacy  [facility]   [phone]    [address] [address]    Hours:  Mon-Fri: 8:30a - 5:00p  Sat-Sun: 9:00a - 1:00p    Drive-thru available   Temporary change to home medications: hold Lipitor and Viagra    None     Estimated body mass index is 34.11 kg/m  as calculated from the following:    Height as of 1/8/21: 1.753 m (5' 9\").    Weight as of 1/8/21: 104.8 kg (231 lb).  GFR Estimate   Date Value Ref Range Status   11/07/2018 50 (L) >60 mL/min/1.7m2 Final     Comment:     Non  GFR Calc     No results found for: [id number]    No follow-ups on file.    Gay Harrington NP  [facility] TRANSPL[facility]    Lata Serna is a 62 year old accompanied by his none, presenting for the following health issues:  No chief complaint on file.      HPI   Symptoms started Monday.  Tests positive for covid yesterday.  Coughing in AM    Review of Systems   Constitutional, HEENT, cardiovascular, pulmonary, gi and gu systems are negative, except as otherwise noted.      Objective           Vitals:  No vitals were obtained today due to " virtual visit.    Physical Exam   GENERAL: Healthy, alert and no distress  EYES: Eyes grossly normal to inspection.  No discharge or erythema, or obvious scleral/conjunctival abnormalities.  RESP: No audible wheeze, cough, or visible cyanosis.  No visible retractions or increased work of breathing.    SKIN: Visible skin clear. No significant rash, abnormal pigmentation or lesions.  NEURO: Cranial nerves grossly intact.  Mentation and speech appropriate for age.  PSYCH: Mentation appears normal, affect normal/bright, judgement and insight intact, normal speech and appearance well-groomed.    No results found for any visits on 07/06/22.          Video-Visit Details    Video Start Time: 11:40    Type of service:  Video Visit    Video End Time:12:00 PM    Originating Location (pt. Location): Home    Distant Location (provider location):  [facility] TRANSPLANT CLINIC     Platform used for Video Visit: Unable to complete video visit        Again, thank you for allowing me to participate in the care of your patient.        Sincerely,        Gay Harrington NP

## 2022-07-06 NOTE — PROGRESS NOTES
"Kareem is a 62 year old who is being evaluated via a billable video visit.      How would you like to obtain your AVS? MyChart  If the video visit is dropped, the invitation should be resent by: Send to e-mail at: [email or web address]  Will anyone else be joining your video visit? No          Assessment & Plan     There are no diagnoses linked to this encounter.      No LOS data to display   Time spent doing chart review, history and exam, documentation and further activities per the note       MEDICATIONS:        - Hold Lipitor and Viagra  other medications without change  COVID-19 positive patient.  Encounter for consideration of medication intervention. Patient does qualify for a prescription. Full discussion with patient including medication options, risks and benefits. Potential drug interactions reviewed with patient.     Treatment Planned Paxlovid, Rx sent to [place] pharmacy  [facility]   [phone]    [address] [address]    Hours:  Mon-Fri: 8:30a - 5:00p  Sat-Sun: 9:00a - 1:00p    Drive-thru available   Temporary change to home medications: hold Lipitor and Viagra    None     Estimated body mass index is 34.11 kg/m  as calculated from the following:    Height as of 1/8/21: 1.753 m (5' 9\").    Weight as of 1/8/21: 104.8 kg (231 lb).  GFR Estimate   Date Value Ref Range Status   11/07/2018 50 (L) >60 mL/min/1.7m2 Final     Comment:     Non  GFR Calc     No results found for: [id number]    No follow-ups on file.    Gay Harrington NP  [facility]    Subjective   Kareem is a 62 year old accompanied by his none, presenting for the following health issues:  No chief complaint on file.      HPI   Symptoms started Monday.  Tests positive for covid yesterday.  Coughing in AM    Review of Systems   Constitutional, HEENT, cardiovascular, pulmonary, gi and gu systems are negative, except as otherwise noted.      Objective     "       Vitals:  No vitals were obtained today due to virtual visit.    Physical Exam   GENERAL: Healthy, alert and no distress  EYES: Eyes grossly normal to inspection.  No discharge or erythema, or obvious scleral/conjunctival abnormalities.  RESP: No audible wheeze, cough, or visible cyanosis.  No visible retractions or increased work of breathing.    SKIN: Visible skin clear. No significant rash, abnormal pigmentation or lesions.  NEURO: Cranial nerves grossly intact.  Mentation and speech appropriate for age.  PSYCH: Mentation appears normal, affect normal/bright, judgement and insight intact, normal speech and appearance well-groomed.    No results found for any visits on 07/06/22.          Video-Visit Details    Video Start Time: 11:40    Type of service:  Video Visit    Video End Time:12:00 PM    Originating Location (pt. Location): Home    Distant Location (provider location):  [facility] TRANSPLANT CLINIC     Platform used for Video Visit: Unable to complete video visit    .  ..

## 2022-09-07 ENCOUNTER — TELEPHONE (OUTPATIENT)
Dept: TRANSPLANT | Facility: CLINIC | Age: 62
End: 2022-09-07

## 2022-09-07 DIAGNOSIS — Z94.4 LIVER TRANSPLANTED (H): ICD-10-CM

## 2022-09-08 RX ORDER — PREDNISONE 5 MG/1
TABLET ORAL
Qty: 90 TABLET | Refills: 3 | Status: SHIPPED | OUTPATIENT
Start: 2022-09-08 | End: 2023-08-16

## 2022-09-08 RX ORDER — MYCOPHENOLIC ACID 360 MG/1
TABLET, DELAYED RELEASE ORAL
Qty: 360 TABLET | Refills: 3 | Status: SHIPPED | OUTPATIENT
Start: 2022-09-08 | End: 2023-08-16

## 2022-09-11 ENCOUNTER — HEALTH MAINTENANCE LETTER (OUTPATIENT)
Age: 62
End: 2022-09-11

## 2022-10-05 ENCOUNTER — DOCUMENTATION ONLY (OUTPATIENT)
Dept: TRANSPLANT | Facility: CLINIC | Age: 62
End: 2022-10-05

## 2022-12-14 ENCOUNTER — TELEPHONE (OUTPATIENT)
Dept: TRANSPLANT | Facility: CLINIC | Age: 62
End: 2022-12-14

## 2023-06-03 ENCOUNTER — HEALTH MAINTENANCE LETTER (OUTPATIENT)
Age: 63
End: 2023-06-03

## 2023-07-03 DIAGNOSIS — Z94.4 LIVER REPLACED BY TRANSPLANT (H): Primary | ICD-10-CM

## 2023-08-16 DIAGNOSIS — Z94.4 LIVER TRANSPLANTED (H): ICD-10-CM

## 2023-08-16 RX ORDER — MYCOPHENOLIC ACID 360 MG/1
TABLET, DELAYED RELEASE ORAL
Qty: 360 TABLET | Refills: 3 | Status: SHIPPED | OUTPATIENT
Start: 2023-08-16 | End: 2024-08-02

## 2023-08-16 RX ORDER — PREDNISONE 5 MG/1
TABLET ORAL
Qty: 90 TABLET | Refills: 3 | Status: SHIPPED | OUTPATIENT
Start: 2023-08-16 | End: 2024-08-02

## 2023-09-12 ENCOUNTER — TELEPHONE (OUTPATIENT)
Dept: TRANSPLANT | Facility: CLINIC | Age: 63
End: 2023-09-12
Payer: COMMERCIAL

## 2023-09-12 NOTE — LETTER
PHYSICIAN ORDERS  [facility] [place] fax:  [phone]       DATE & TIME ISSUED: 2023 10:10 AM  PATIENT NAME: Yung Grande   : 1960     [facility] MR# [if applicable]: [id number]     DIAGNOSIS:  Liver Transplanted  ICD-10 CODE: Z94.4    Please, discontinue blood draw result for Tacrolimus 12 hour trough. Patient is not taking this immunosuppressant medication and this order was sent in error. Seferino. Thank you!    Any questions please call: [phone].    .  Hepatology/Liver Transplant  Medical Director, Liver Transplantation  [facility]

## 2023-09-12 NOTE — TELEPHONE ENCOUNTER
Left message with patient to notify him of clerical error with laboratory results seeing a tacrolimus 12-hour trough level.  Chart review shows order sent in July for Tac level, but patient is not on tacrolimus immunosuppression.  Updated order sent to [facility] to discontinue lab requisition for tacrolimus level.

## 2024-01-15 ENCOUNTER — TELEPHONE (OUTPATIENT)
Dept: TRANSPLANT | Facility: CLINIC | Age: 64
End: 2024-01-15
Payer: COMMERCIAL

## 2024-01-15 NOTE — TELEPHONE ENCOUNTER
Fax new lab orders to [facility]  fax- [phone]- Make sure Karenro is not on the orders  He is not on Tacro  Also wonders if he will be on yearly labs continuous forever labs?  Call back re yearly labs

## 2024-01-15 NOTE — TELEPHONE ENCOUNTER
Transplant Social Work Services Phone Call    Data: I received a VM from Kareem. He requested a call back.   Intervention: I called Kareem and he reported that his wife will be retiring and that he will need to obtain insurance. It does not appear that he will qualify for Medicare under SSDI. I asked him to see when he qualified for SSDI, and also to see if electing COBRA makes sense for him until he qualifies for Medicare. I also reviewed that if now, he would need to work through the [employer] website to obtain a marketplace plan. He will call this writer back with information above.   Assessment: No new assessment   Education provided by SW: insurance   Plan:  Waiting on a return call.     ROBERT Polo, [facility]  Liver Transplant   [facility]  Phone: [phone]  Pager: [phone]

## 2024-01-15 NOTE — LETTER
OUTPATIENT LABORATORY TEST ORDER   [facility] [place]  [phone]    Patient Name: Yung Grande   YOB: 1960     [facility] MR# [if applicable]: [id number]   Date & Time: January 15, 2024  11:52 AM  Expiration Date: 1 year after date issued     Diagnosis: Liver Transplant (ICD-10 Z94.4)   Aftercare following organ transplant (ICD-10 Z48.288)   Long term use of medications (ICD-10 Z79.899)      We ask your assistance in obtaining the following laboratory tests, which are part of our routine surveillance program for Solid Organ Transplant patients.     Please fax each result to [phone], same day as resulted/available    Critical lab results page [phone]    >Every 6-months post-transplant  Magnesium with next lab draw      Every 2-3 months and as needed  CBC with Platelets   Basic Metabolic Panel   Hepatic panel       Labs annually due September 2024  Fasting Lipid Panel    Labs annually due with next lab draw  Urine protein/creatinine ratio  UA with reflex to micro  Phosphorous    If you have any questions, please call [facility]- [phone] or [phone], Fax- [phone].    .  Hepatology/Liver Transplant  Medical Director, Liver Transplantation  [facility]

## 2024-01-15 NOTE — TELEPHONE ENCOUNTER
Spoke to pt who states that lab orders need to be updated as pt has an appt this Wednesday. Informed pt that he is due to give a urine sample as well.

## 2024-02-20 ENCOUNTER — OFFICE VISIT (OUTPATIENT)
Dept: GASTROENTEROLOGY | Facility: CLINIC | Age: 64
End: 2024-02-20
Attending: INTERNAL MEDICINE
Payer: COMMERCIAL

## 2024-02-20 VITALS
TEMPERATURE: 98.7 F | BODY MASS INDEX: 39.22 KG/M2 | OXYGEN SATURATION: 96 % | HEART RATE: 96 BPM | SYSTOLIC BLOOD PRESSURE: 128 MMHG | DIASTOLIC BLOOD PRESSURE: 88 MMHG | WEIGHT: 265.6 LBS

## 2024-02-20 DIAGNOSIS — Z94.4 LIVER REPLACED BY TRANSPLANT (H): Primary | ICD-10-CM

## 2024-02-20 DIAGNOSIS — D84.9 IMMUNOSUPPRESSED STATUS (H): ICD-10-CM

## 2024-02-20 PROCEDURE — 99214 OFFICE O/P EST MOD 30 MIN: CPT | Performed by: INTERNAL MEDICINE

## 2024-02-20 RX ORDER — ATORVASTATIN CALCIUM 10 MG/1
10 TABLET, FILM COATED ORAL DAILY
COMMUNITY
Start: 2019-01-15

## 2024-02-20 RX ORDER — VITAMIN B COMPLEX
1 TABLET ORAL DAILY
COMMUNITY

## 2024-02-20 RX ORDER — MELATONIN 5 MG
5 TABLET,CHEWABLE ORAL AT BEDTIME
COMMUNITY

## 2024-02-20 RX ORDER — SILDENAFIL CITRATE 20 MG/1
TABLET ORAL
COMMUNITY
Start: 2023-03-13

## 2024-02-20 ASSESSMENT — PAIN SCALES - GENERAL: PAINLEVEL: NO PAIN (0)

## 2024-02-20 NOTE — LETTER
2/20/2024         RE: Yung Gradne  [address] [facility] [place] [address]        Dear Colleague,    Thank you for referring your patient, Yung Grande, to the [facility] HEPATOLOGY [facility]IN[facility] [place]. Please see a copy of my visit note below.      [facility]  LIVER TRANSPLANT CLINIC FOLLOW UP        A/P  Mr. Grande is a 63 Y M s/p LT for ETOH 4/5/16. On MMF and prednisone. Medically doing extremely well.    IS Continue MMF and pred  Continue monitoring labs and IS level every 3 months to assess for appropriate dosing and side effects from IS including ANA, pancytopenia, hyperkalemia, hypomagnesemia.  Graft  function Excellent   CKD Stage II. Kidney function stable, creat around 1.3-1.5 and follows with nephrology     Obesity Continues to work on this.\.     Past NATALIA Stopped iron as his iron levels are sufficient, hgb normal     Skin cancer screening rec Derm yearly.     Bone health Osteopenia DEXA 2019.   Colon polyps Colonoscopy 7/11/19. Normal  Proph/HCM Shingles and pneumovax booster 2019.   Hypercholesterolemia ok for statin or whatever is recommended by PCP  Other recommend against herbal supplements    RTC 1 year in person or video       ==============================================  S: Mr. 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 LV was 3 years ago. He is here with his wife.    He is doing well. He had questions about his lab orders.  He aslo has questions about something he has seen on TV that is a vegetable concentrate as well as a product called [employer]. He also asks about eating fish both cooked and in sushi form.    He has been in contact with Jena JAMES about insurance.    He gets tired in the afternoon.   He gets a little wave in his eye around the same time.  This doesn't occur every day but often. He wonders if this is related to the meds..       EXPLANT: Steatosis, no HCC, PV thrombus.  IS: MMF and prednisone. Taken off tac for kidney  dysfunction. Had mouth sores with everolimus.  LABS: Last labs 24 were normal or at [facility]  REJECTION: None.  BILIARY ISSUES: None  STENT: Out on 16  KIDNEY FUNCTION: Creat 1.24  BP: Good. No meds. At home he has 110-130/60-70s  PREV: UTD on screening. Colonoscopy 19 no polyps  Derm 19     Flu shot done this year  Dexa   normal  DISEASE RECURRENCE: No alcohol for several years.  OTHER ISSUES: Had a spot in transplanted liver on CT. Follow up MRI showed hemangioma      SOC: Back to work full time as a . His sister  from liver disease (probably LASSITER) and multifocal HCC in .     ROS: 10 point ROS neg other than the symptoms noted above in the HPI.  Exam  /88   Pulse 96   Temp 98.7  F (37.1  C) (Oral)   Wt 120.5 kg (265 lb 9.6 oz)   SpO2 96%   BMI 39.22 kg/m      Gen Alert pleasant NAD  Resp No difficulty breathing. No cough  Skin No Jaundice  Eyes No icterus  Neuro SANFORD  MSK no muscle wasting  Psyche Pleasant, appropriate. Well groomed.              Again, thank you for allowing me to participate in the care of your patient.        Sincerely,        Stefany Rand MD

## 2024-02-20 NOTE — NURSING NOTE
Chief Complaint   Patient presents with    RECHECK     1 yr f/u       /88   Pulse 96   Temp 98.7  F (37.1  C) (Oral)   Wt 120.5 kg (265 lb 9.6 oz)   SpO2 96%   BMI 39.22 kg/m      Maxwell Mendoza on 2/20/2024 at 10:52 AM

## 2024-02-20 NOTE — PROGRESS NOTES
[facility]  LIVER TRANSPLANT CLINIC FOLLOW UP        A/P  Mr. Grande is a 63 Y M s/p LT for ETOH 4/5/16. On MMF and prednisone. Medically doing extremely well.    IS Continue MMF and pred  Continue monitoring labs and IS level every 3 months to assess for appropriate dosing and side effects from IS including ANA, pancytopenia, hyperkalemia, hypomagnesemia.  Graft  function Excellent   CKD Stage II. Kidney function stable, creat around 1.3-1.5 and follows with nephrology     Obesity Continues to work on this.\.     Past NATALIA Stopped iron as his iron levels are sufficient, hgb normal     Skin cancer screening rec Derm yearly.     Bone health Osteopenia DEXA 2019.   Colon polyps Colonoscopy 7/11/19. Normal  Proph/HCM Shingles and pneumovax booster 2019.   Hypercholesterolemia ok for statin or whatever is recommended by PCP  Other recommend against herbal supplements    RTC 1 year in person or video       ==============================================  S: Mr. 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 LV was 3 years ago. He is here with his wife.    He is doing well. He had questions about his lab orders.  He aslo has questions about something he has seen on TV that is a vegetable concentrate as well as a product called [employer]. He also asks about eating fish both cooked and in sushi form.    He has been in contact with Jena JAMES about insurance.    He gets tired in the afternoon.   He gets a little wave in his eye around the same time.  This doesn't occur every day but often. He wonders if this is related to the meds..       EXPLANT: Steatosis, no HCC, PV thrombus.  IS: MMF and prednisone. Taken off tac for kidney dysfunction. Had mouth sores with everolimus.  LABS: Last labs 1/17/24 were normal or at [facility]  REJECTION: None.  BILIARY ISSUES: None  STENT: Out on 7/25/16  KIDNEY FUNCTION: Creat 1.24  BP: Good. No meds. At home he has 110-130/60-70s  PREV: UTD on screening.  Colonoscopy 19 no polyps  Derm 19     Flu shot done this year  Dexa   normal  DISEASE RECURRENCE: No alcohol for several years.  OTHER ISSUES: Had a spot in transplanted liver on CT. Follow up MRI showed hemangioma      SOC: Back to work full time as a . His sister  from liver disease (probably LASSITER) and multifocal HCC in 2017.     ROS: 10 point ROS neg other than the symptoms noted above in the HPI.  Exam  /88   Pulse 96   Temp 98.7  F (37.1  C) (Oral)   Wt 120.5 kg (265 lb 9.6 oz)   SpO2 96%   BMI 39.22 kg/m      Gen Alert pleasant NAD  Resp No difficulty breathing. No cough  Skin No Jaundice  Eyes No icterus  Neuro SANFORD  MSK no muscle wasting  Psyche Pleasant, appropriate. Well groomed.

## 2024-03-14 ENCOUNTER — TELEPHONE (OUTPATIENT)
Dept: TRANSPLANT | Facility: CLINIC | Age: 64
End: 2024-03-14
Payer: COMMERCIAL

## 2024-03-14 NOTE — TELEPHONE ENCOUNTER
Transplant Social Work Services Phone Call    Data: Kareem is s/p transplant and his wife will be retiring   Intervention: Kareem called this writer to update me re: his insurance situation. I reviewed Medicare and [employer]. He is unsure when his wife will retire. Kareem qualified for SSDI in August 2023. He will not qualify for SSDI for two years after he was awarded SSDI. I indicated that his options for insurance once his spouse retires is COBRA or [employer]. I encourage him to obtain the information on COBRA and explore the [employer] website. I did refer him to contact a nagivator or a  if he needs further assistance with the [employer] website./selecting an insurance company. I noted that we are out of network with [employer]   Assessment: No new assessment   Education provided by : See above  Plan: Kareem knows how to reach me if needed.      ROBERT Polo, [facility]  Liver Transplant   [facility]  Phone: [phone]

## 2024-07-13 ENCOUNTER — HEALTH MAINTENANCE LETTER (OUTPATIENT)
Age: 64
End: 2024-07-13

## 2024-07-16 DIAGNOSIS — Z94.4 LIVER REPLACED BY TRANSPLANT (H): Primary | ICD-10-CM

## 2024-07-16 DIAGNOSIS — Z13.220 LIPID SCREENING: ICD-10-CM

## 2024-08-01 DIAGNOSIS — Z94.4 LIVER TRANSPLANTED (H): ICD-10-CM

## 2024-08-02 RX ORDER — PREDNISONE 5 MG/1
TABLET ORAL
Qty: 90 TABLET | Refills: 3 | Status: SHIPPED | OUTPATIENT
Start: 2024-08-02

## 2024-08-02 RX ORDER — MYCOPHENOLIC ACID 360 MG/1
TABLET, DELAYED RELEASE ORAL
Qty: 360 TABLET | Refills: 3 | Status: SHIPPED | OUTPATIENT
Start: 2024-08-02

## 2025-01-16 ENCOUNTER — DOCUMENTATION ONLY (OUTPATIENT)
Dept: TRANSPLANT | Facility: CLINIC | Age: 65
End: 2025-01-16
Payer: COMMERCIAL

## 2025-01-31 ENCOUNTER — TELEPHONE (OUTPATIENT)
Dept: TRANSPLANT | Facility: CLINIC | Age: 65
End: 2025-01-31
Payer: COMMERCIAL

## 2025-01-31 NOTE — TELEPHONE ENCOUNTER
Transplant Social Work Services Phone Call    Data: Kareem called this writer to inquire about Medicare.   Intervention: I called Kareem back. He indicated that he is still covered under his spouses plan, and is looking into Medicare plans once he turned 65. I briefly reviewed the difference between Medigap and Advantage plans. I reviewed that he will need to  all the parts of Medicare to be fully covered. His wife has a  that they worked with in the past the helped her with Medicare. I advised him to speak further with them. I also encouraged him to bring a list of his medications and providers to them so they are able to make sure that he will be well covered when he switches to Medicare. No further questions or concerns.   Assessment: Kareem will be electing/switching to Medicare in May 2025.   Education provided by JACOB: See above  Plan:This writer will continue to be available for ongoing support.      ROBERT Polo, [facility]  Liver Transplant   [facility]  Phone: [phone]

## 2025-03-10 ENCOUNTER — TELEPHONE (OUTPATIENT)
Dept: TRANSPLANT | Facility: CLINIC | Age: 65
End: 2025-03-10
Payer: COMMERCIAL

## 2025-03-10 DIAGNOSIS — Z94.4 LIVER REPLACED BY TRANSPLANT (H): Primary | ICD-10-CM

## 2025-03-10 NOTE — TELEPHONE ENCOUNTER
Message from scheduling team:    Adilene Francisco sent to Aissatou Hawkins RN  Replies will be sent to [facility] Scheduling Access Center  Faheem Serna is reschedule to 05/27/25 - can you place a scheduling request for us to attach?

## 2025-03-13 ENCOUNTER — TELEPHONE (OUTPATIENT)
Dept: GASTROENTEROLOGY | Facility: CLINIC | Age: 65
End: 2025-03-13
Payer: COMMERCIAL

## 2025-03-13 NOTE — TELEPHONE ENCOUNTER
Left Voicemail (1st Attempt) and Sent Mychart (1st Attempt) for the patient to call back and schedule the following:    Appointment Type: Liver Post-Return TXP HEPT  Provider: Dr. Stefany Rand  Appt date: Approx. 3/14 or 3/18  Specialty phone number: [phone]  Additional appointment(s) needed: NA  Additional Notes:

## 2025-04-07 ENCOUNTER — TELEPHONE (OUTPATIENT)
Dept: TRANSPLANT | Facility: CLINIC | Age: 65
End: 2025-04-07
Payer: COMMERCIAL

## 2025-04-07 NOTE — TELEPHONE ENCOUNTER
Transplant Social Work Services Phone Call    Data: Kareem is s/p liver transplant.   Intervention: I received a call from patient to inquire about Medicare plans. He noted that he spoke with a  regarding BC[employer] plan. I noted that we are out of network with [employer] and some [employer] plans. I briefly reviewed Advantage vs Medigap. I also reviewed medicaid plans and qualifications. No further questions at this time.   Assessment: Patient will be moving to Medicare next month.   Education provided by SW: See above  Plan: This writer will continue to be available for support.      ROBERT Polo, [facility]  Liver Transplant   [facility]  Phone: [phone]

## 2025-05-15 ENCOUNTER — RESULTS FOLLOW-UP (OUTPATIENT)
Dept: TRANSPLANT | Facility: CLINIC | Age: 65
End: 2025-05-15

## 2025-05-27 ENCOUNTER — OFFICE VISIT (OUTPATIENT)
Dept: GASTROENTEROLOGY | Facility: CLINIC | Age: 65
End: 2025-05-27
Attending: INTERNAL MEDICINE
Payer: MEDICARE

## 2025-05-27 VITALS
HEIGHT: 69 IN | DIASTOLIC BLOOD PRESSURE: 83 MMHG | SYSTOLIC BLOOD PRESSURE: 127 MMHG | HEART RATE: 82 BPM | TEMPERATURE: 98.2 F | WEIGHT: 246.7 LBS | BODY MASS INDEX: 36.54 KG/M2 | OXYGEN SATURATION: 98 %

## 2025-05-27 DIAGNOSIS — Z94.4 LIVER REPLACED BY TRANSPLANT (H): ICD-10-CM

## 2025-05-27 PROCEDURE — G0463 HOSPITAL OUTPT CLINIC VISIT: HCPCS | Performed by: INTERNAL MEDICINE

## 2025-05-27 RX ORDER — ROSUVASTATIN CALCIUM 20 MG/1
20 TABLET, COATED ORAL DAILY
COMMUNITY
Start: 2024-08-30 | End: 2025-08-30

## 2025-05-27 RX ORDER — ASPIRIN 81 MG/1
81 TABLET, COATED ORAL DAILY
COMMUNITY
Start: 2024-11-11

## 2025-05-27 ASSESSMENT — PAIN SCALES - GENERAL: PAINLEVEL_OUTOF10: SEVERE PAIN (10)

## 2025-05-27 NOTE — PROGRESS NOTES
[facility]  LIVER TRANSPLANT CLINIC FOLLOW UP        A/P  Mr. Grande is a 65 Y M s/p LT for ETOH 4/5/16. On MMF and prednisone. Medically doing extremely well.    IS Continue MMF and pred. Continue monitoring labs and IS level every 3 months to assess for appropriate dosing and side effects from IS including ANA, pancytopenia, hyperkalemia, hypomagnesemia.  Graft  function Excellent   CKD Stage II. Kidney function stable, creat improved int he last year now to 1.1. Follows with nephrology     Obesity Discussed risks. He has lost 20 pounds  CV risk on statin and ASA.    Hypercholesterolemia on statin  Past NATALIA Stopped iron as his iron levels are sufficient, hgb normal    Osteopenia DEXA 2019.     VACCINATIONS  Discussed recommendations to stay up to date on vaccinations including  -INFLUENZA recommended yearly  -COVID recommended to have updated booster. He declines  -PNEUMOCOCCAL due  -HEP A not done  -HEP B completed  -ZOSTER Shingrix 2022  CANCER SCREENING  -Skin cancer: discussed increased risk due to IS. Rec sunscreen, protect skin and yearly derm visit.   -CRC: Colonoscopy. Due 2029  -Lung cancer: NA, nonsmoker  .         Return to clinic in 12 months. In person or via video. I let patient know if they would like to see me in person and they are told there are no appointments available or that we are booking out too far, they should send me a message and I will always find a time to see the patient in person if preferred/desired/needed.         ==============================================  S: Mr. 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     He is having knee problems. Limits his exercise.  He was on antihypertensive medications but didn't tolerate it well. He is no longer on any meeic    EXPLANT: Steatosis, no HCC, PV thrombus.  IS: MMF and prednisone. Taken off tac for kidney dysfunction. Had mouth sores with everolimus.  LABS: Last labs 5/14/25 were normal or at  "BL    REJECTION: None.  BILIARY ISSUES: None  STENT: Out on 16  KIDNEY FUNCTION: Creat 1.24  BP: Good. No meds. At home he has 110-130/60-70s    PREV: UTD on screening. Colonoscopy 19 no polyps  Derm 19     Flu shot done this year  Dexa   normal  DISEASE RECURRENCE: No alcohol for several years ()  OTHER ISSUES: Had a spot in transplanted liver on CT. Follow up MRI showed hemangioma      SOC: Retired    FHX sister  from MASH cirrhosis and multifocal HCC in her 60s in   ROS: 10 point ROS neg other than the symptoms noted above in the HPI.  Exam  /83 (BP Location: Right arm, Patient Position: Sitting, Cuff Size: Adult Regular)   Pulse 82   Temp 98.2  F (36.8  C) (Oral)   Ht 1.753 m (5' 9\")   Wt 111.9 kg (246 lb 11.2 oz)   SpO2 98%   BMI 36.43 kg/m      Gen Alert pleasant NAD  Resp No difficulty breathing. No cough  Skin No Jaundice  Eyes No icterus  Neuro SANFORD  MSK no muscle wasting  Psyche Pleasant, appropriate. Well groomed.            "

## 2025-05-27 NOTE — NURSING NOTE
"Chief Complaint   Patient presents with    RECHECK     Follow up. PT reports no or worsening symptoms.      Vitals:    05/27/25 0829   BP: 127/83   BP Location: Right arm   Patient Position: Sitting   Cuff Size: Adult Regular   Pulse: 82   Temp: 98.2  F (36.8  C)   TempSrc: Oral   SpO2: 98%   Weight: 111.9 kg (246 lb 11.2 oz)   Height: 1.753 m (5' 9\")       BP Readings from Last 3 Encounters:   05/27/25 127/83   02/20/24 128/88   01/10/20 (!) 143/90       /83 (BP Location: Right arm, Patient Position: Sitting, Cuff Size: Adult Regular)   Pulse 82   Temp 98.2  F (36.8  C) (Oral)   Ht 1.753 m (5' 9\")   Wt 111.9 kg (246 lb 11.2 oz)   SpO2 98%   BMI 36.43 kg/m       Renee Rodriguez    "

## 2025-05-27 NOTE — LETTER
5/27/2025      Yung Grande  [address] [address]      Dear Colleague,    Thank you for referring your patient, Yung Grande, to the [facility] [place]. Please see a copy of my visit note below.      [facility]  LIVER TRANSPLANT CLINIC FOLLOW UP        A/P  Mr. Grande is a 65 Y M s/p LT for ETOH 4/5/16. On MMF and prednisone. Medically doing extremely well.    IS Continue MMF and pred. Continue monitoring labs and IS level every 3 months to assess for appropriate dosing and side effects from IS including ANA, pancytopenia, hyperkalemia, hypomagnesemia.  Graft  function Excellent   CKD Stage II. Kidney function stable, creat improved int he last year now to 1.1. Follows with nephrology     Obesity Discussed risks. He has lost 20 pounds  CV risk on statin and ASA.    Hypercholesterolemia on statin  Past NATALIA Stopped iron as his iron levels are sufficient, hgb normal    Osteopenia DEXA 2019.     VACCINATIONS  Discussed recommendations to stay up to date on vaccinations including  -INFLUENZA recommended yearly  -COVID recommended to have updated booster. He declines  -PNEUMOCOCCAL due  -HEP A not done  -HEP B completed  -ZOSTER Shingrix 2022  CANCER SCREENING  -Skin cancer: discussed increased risk due to IS. Rec sunscreen, protect skin and yearly derm visit.   -CRC: Colonoscopy. Due 2029  -Lung cancer: NA, nonsmoker  .         Return to clinic in 12 months. In person or via video. I let patient know if they would like to see me in person and they are told there are no appointments available or that we are booking out too far, they should send me a message and I will always find a time to see the patient in person if preferred/desired/needed.         ==============================================  S: Mr. 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     He is having knee problems. Limits his exercise.  He was on antihypertensive medications  "but didn't tolerate it well. He is no longer on any meeic    EXPLANT: Steatosis, no HCC, PV thrombus.  IS: MMF and prednisone. Taken off tac for kidney dysfunction. Had mouth sores with everolimus.  LABS: Last labs 25 were normal or at BL    REJECTION: None.  BILIARY ISSUES: None  STENT: Out on 16  KIDNEY FUNCTION: Creat 1.24  BP: Good. No meds. At home he has 110-130/60-70s    PREV: UTD on screening. Colonoscopy 19 no polyps  Derm 19     Flu shot done this year  Dexa   normal  DISEASE RECURRENCE: No alcohol for several years ()  OTHER ISSUES: Had a spot in transplanted liver on CT. Follow up MRI showed hemangioma      SOC: Retired    FHX sister  from MASH cirrhosis and multifocal HCC in her 60s in 2017  ROS: 10 point ROS neg other than the symptoms noted above in the HPI.  Exam  /83 (BP Location: Right arm, Patient Position: Sitting, Cuff Size: Adult Regular)   Pulse 82   Temp 98.2  F (36.8  C) (Oral)   Ht 1.753 m (5' 9\")   Wt 111.9 kg (246 lb 11.2 oz)   SpO2 98%   BMI 36.43 kg/m      Gen Alert pleasant NAD  Resp No difficulty breathing. No cough  Skin No Jaundice  Eyes No icterus  Neuro SANFORD  MSK no muscle wasting  Psyche Pleasant, appropriate. Well groomed.              Again, thank you for allowing me to participate in the care of your patient.        Sincerely,        Stefany Rand MD    Electronically signed"

## 2025-06-28 ENCOUNTER — HEALTH MAINTENANCE LETTER (OUTPATIENT)
Age: 65
End: 2025-06-28

## 2025-07-22 DIAGNOSIS — Z94.4 LIVER TRANSPLANTED (H): ICD-10-CM

## 2025-07-22 RX ORDER — MYCOPHENOLIC ACID 360 MG/1
720 TABLET, DELAYED RELEASE ORAL
Qty: 360 TABLET | Refills: 3 | Status: SHIPPED | OUTPATIENT
Start: 2025-07-22

## 2025-08-04 DIAGNOSIS — Z94.4 LIVER TRANSPLANTED (H): ICD-10-CM

## 2025-08-04 RX ORDER — PREDNISONE 5 MG/1
5 TABLET ORAL DAILY
Qty: 90 TABLET | Refills: 3 | Status: SHIPPED | OUTPATIENT
Start: 2025-08-04

## (undated) RX ORDER — FENTANYL CITRATE 50 UG/ML
INJECTION, SOLUTION INTRAMUSCULAR; INTRAVENOUS
Status: DISPENSED
Start: 2019-07-11